# Patient Record
Sex: FEMALE | Race: WHITE | NOT HISPANIC OR LATINO | Employment: FULL TIME | URBAN - METROPOLITAN AREA
[De-identification: names, ages, dates, MRNs, and addresses within clinical notes are randomized per-mention and may not be internally consistent; named-entity substitution may affect disease eponyms.]

---

## 2021-03-23 ENCOUNTER — OFFICE VISIT (OUTPATIENT)
Dept: URGENT CARE | Facility: CLINIC | Age: 21
End: 2021-03-23
Payer: COMMERCIAL

## 2021-03-23 VITALS
HEART RATE: 88 BPM | BODY MASS INDEX: 24.92 KG/M2 | WEIGHT: 140.63 LBS | TEMPERATURE: 97 F | SYSTOLIC BLOOD PRESSURE: 122 MMHG | OXYGEN SATURATION: 97 % | HEIGHT: 63 IN | DIASTOLIC BLOOD PRESSURE: 82 MMHG

## 2021-03-23 DIAGNOSIS — R51.9 ACUTE NONINTRACTABLE HEADACHE, UNSPECIFIED HEADACHE TYPE: ICD-10-CM
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
## 2021-03-23 DIAGNOSIS — M54.12 CERVICAL RADICULOPATHY: ICD-10-CM

## 2021-03-23 PROCEDURE — 99204 OFFICE O/P NEW MOD 45 MIN: CPT | Mod: S$GLB,,, | Performed by: NURSE PRACTITIONER

## 2021-03-23 PROCEDURE — 99204 PR OFFICE/OUTPT VISIT, NEW, LEVL IV, 45-59 MIN: ICD-10-PCS | Mod: S$GLB,,, | Performed by: NURSE PRACTITIONER

## 2021-03-23 RX ORDER — IBUPROFEN 800 MG/1
800 TABLET ORAL EVERY 8 HOURS PRN
Qty: 30 TABLET | Refills: 0 | Status: SHIPPED | OUTPATIENT
Start: 2021-03-23 | End: 2021-04-02

## 2021-03-23 RX ORDER — METHOCARBAMOL 750 MG/1
TABLET, FILM COATED ORAL
Qty: 30 TABLET | Refills: 0 | OUTPATIENT
Start: 2021-03-23 | End: 2022-05-13

## 2021-04-12 ENCOUNTER — OFFICE VISIT (OUTPATIENT)
Dept: OBSTETRICS AND GYNECOLOGY | Facility: CLINIC | Age: 21
End: 2021-04-12
Payer: COMMERCIAL

## 2021-04-12 VITALS
SYSTOLIC BLOOD PRESSURE: 120 MMHG | WEIGHT: 143.75 LBS | BODY MASS INDEX: 25.47 KG/M2 | HEIGHT: 63 IN | DIASTOLIC BLOOD PRESSURE: 70 MMHG

## 2021-04-12 DIAGNOSIS — Z97.5 NEXPLANON IN PLACE: ICD-10-CM

## 2021-04-12 DIAGNOSIS — Z30.46 ENCOUNTER FOR SURVEILLANCE OF IMPLANTABLE SUBDERMAL CONTRACEPTIVE: Primary | ICD-10-CM

## 2021-04-12 PROCEDURE — 99999 PR PBB SHADOW E&M-EST. PATIENT-LVL III: CPT | Mod: PBBFAC,,, | Performed by: SPECIALIST

## 2021-04-12 PROCEDURE — 99203 OFFICE O/P NEW LOW 30 MIN: CPT | Mod: S$GLB,,, | Performed by: SPECIALIST

## 2021-04-12 PROCEDURE — 99999 PR PBB SHADOW E&M-EST. PATIENT-LVL III: ICD-10-PCS | Mod: PBBFAC,,, | Performed by: SPECIALIST

## 2021-04-12 PROCEDURE — 99203 PR OFFICE/OUTPT VISIT, NEW, LEVL III, 30-44 MIN: ICD-10-PCS | Mod: S$GLB,,, | Performed by: SPECIALIST

## 2021-04-12 RX ORDER — ACYCLOVIR 200 MG/1
CAPSULE ORAL
COMMUNITY

## 2021-06-18 ENCOUNTER — TELEPHONE (OUTPATIENT)
Dept: OBSTETRICS AND GYNECOLOGY | Facility: CLINIC | Age: 21
End: 2021-06-18

## 2022-05-13 ENCOUNTER — HOSPITAL ENCOUNTER (EMERGENCY)
Facility: HOSPITAL | Age: 22
Discharge: HOME | End: 2022-05-13
Attending: EMERGENCY MEDICINE
Payer: COMMERCIAL

## 2022-05-13 VITALS
SYSTOLIC BLOOD PRESSURE: 111 MMHG | DIASTOLIC BLOOD PRESSURE: 76 MMHG | HEIGHT: 62 IN | HEART RATE: 79 BPM | RESPIRATION RATE: 18 BRPM | OXYGEN SATURATION: 99 % | TEMPERATURE: 98 F | BODY MASS INDEX: 25.76 KG/M2 | WEIGHT: 140 LBS

## 2022-05-13 DIAGNOSIS — M25.511 ACUTE PAIN OF RIGHT SHOULDER: ICD-10-CM

## 2022-05-13 DIAGNOSIS — S16.1XXA CERVICAL STRAIN, ACUTE, INITIAL ENCOUNTER: ICD-10-CM
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
## 2022-05-13 DIAGNOSIS — R52 PAIN: ICD-10-CM

## 2022-05-13 LAB
B-HCG UR QL: NEGATIVE
CTP QC/QA: YES

## 2022-05-13 PROCEDURE — 25000003 PHARM REV CODE 250: Performed by: NURSE PRACTITIONER

## 2022-05-13 PROCEDURE — 99285 EMERGENCY DEPT VISIT HI MDM: CPT | Mod: 25

## 2022-05-13 PROCEDURE — 81025 URINE PREGNANCY TEST: CPT | Performed by: NURSE PRACTITIONER

## 2022-05-13 RX ORDER — METHOCARBAMOL 500 MG/1
500 TABLET, FILM COATED ORAL
Status: COMPLETED | OUTPATIENT
Start: 2022-05-13 | End: 2022-05-13

## 2022-05-13 RX ORDER — NAPROXEN 250 MG/1
500 TABLET ORAL
Status: COMPLETED | OUTPATIENT
Start: 2022-05-13 | End: 2022-05-13

## 2022-05-13 RX ORDER — METHOCARBAMOL 500 MG/1
500 TABLET, FILM COATED ORAL 3 TIMES DAILY
Qty: 30 TABLET | Refills: 0 | Status: SHIPPED | OUTPATIENT
Start: 2022-05-13 | End: 2022-05-18

## 2022-05-13 RX ORDER — DICLOFENAC SODIUM 50 MG/1
50 TABLET, DELAYED RELEASE ORAL 3 TIMES DAILY PRN
Qty: 20 TABLET | Refills: 2 | Status: SHIPPED | OUTPATIENT
Start: 2022-05-13

## 2022-05-13 RX ADMIN — NAPROXEN 500 MG: 250 TABLET ORAL at 04:05

## 2022-05-13 RX ADMIN — METHOCARBAMOL TABLETS 500 MG: 500 TABLET, COATED ORAL at 04:05

## 2022-05-13 NOTE — Clinical Note
"Sherry Combsa" Peter was seen and treated in our emergency department on 5/13/2022.  She may return to work on 05/17/2022.       If you have any questions or concerns, please don't hesitate to call.      Mindy Guzman NP"
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
## 2022-11-17 ENCOUNTER — OFFICE VISIT (OUTPATIENT)
Dept: URGENT CARE | Facility: CLINIC | Age: 22
End: 2022-11-17
Payer: COMMERCIAL

## 2022-11-17 VITALS
TEMPERATURE: 98 F | DIASTOLIC BLOOD PRESSURE: 75 MMHG | WEIGHT: 140 LBS | BODY MASS INDEX: 25.76 KG/M2 | HEIGHT: 62 IN | OXYGEN SATURATION: 99 % | RESPIRATION RATE: 16 BRPM | HEART RATE: 77 BPM | SYSTOLIC BLOOD PRESSURE: 105 MMHG

## 2022-11-17 DIAGNOSIS — M79.675 PAIN AND SWELLING OF TOE OF LEFT FOOT: Primary | ICD-10-CM

## 2022-11-17 DIAGNOSIS — M79.89 PAIN AND SWELLING OF TOE OF LEFT FOOT: Primary | ICD-10-CM

## 2022-11-17 PROCEDURE — 3078F DIAST BP <80 MM HG: CPT | Mod: CPTII,S$GLB,, | Performed by: FAMILY MEDICINE

## 2022-11-17 PROCEDURE — 3074F PR MOST RECENT SYSTOLIC BLOOD PRESSURE < 130 MM HG: ICD-10-PCS | Mod: CPTII,S$GLB,, | Performed by: FAMILY MEDICINE

## 2022-11-17 PROCEDURE — 3074F SYST BP LT 130 MM HG: CPT | Mod: CPTII,S$GLB,, | Performed by: FAMILY MEDICINE

## 2022-11-17 PROCEDURE — 1160F PR REVIEW ALL MEDS BY PRESCRIBER/CLIN PHARMACIST DOCUMENTED: ICD-10-PCS | Mod: CPTII,S$GLB,, | Performed by: FAMILY MEDICINE

## 2022-11-17 PROCEDURE — 1159F MED LIST DOCD IN RCRD: CPT | Mod: CPTII,S$GLB,, | Performed by: FAMILY MEDICINE

## 2022-11-17 PROCEDURE — 99213 PR OFFICE/OUTPT VISIT, EST, LEVL III, 20-29 MIN: ICD-10-PCS | Mod: S$GLB,,, | Performed by: FAMILY MEDICINE

## 2022-11-17 PROCEDURE — 3078F PR MOST RECENT DIASTOLIC BLOOD PRESSURE < 80 MM HG: ICD-10-PCS | Mod: CPTII,S$GLB,, | Performed by: FAMILY MEDICINE

## 2022-11-17 PROCEDURE — 3008F BODY MASS INDEX DOCD: CPT | Mod: CPTII,S$GLB,, | Performed by: FAMILY MEDICINE

## 2022-11-17 PROCEDURE — 3008F PR BODY MASS INDEX (BMI) DOCUMENTED: ICD-10-PCS | Mod: CPTII,S$GLB,, | Performed by: FAMILY MEDICINE

## 2022-11-17 PROCEDURE — 1160F RVW MEDS BY RX/DR IN RCRD: CPT | Mod: CPTII,S$GLB,, | Performed by: FAMILY MEDICINE

## 2022-11-17 PROCEDURE — 99213 OFFICE O/P EST LOW 20 MIN: CPT | Mod: S$GLB,,, | Performed by: FAMILY MEDICINE

## 2022-11-17 PROCEDURE — 1159F PR MEDICATION LIST DOCUMENTED IN MEDICAL RECORD: ICD-10-PCS | Mod: CPTII,S$GLB,, | Performed by: FAMILY MEDICINE

## 2022-11-17 RX ORDER — SULFAMETHOXAZOLE AND TRIMETHOPRIM 800; 160 MG/1; MG/1
1 TABLET ORAL 2 TIMES DAILY
Qty: 20 TABLET | Refills: 0 | Status: SHIPPED | OUTPATIENT
Start: 2022-11-17

## 2022-11-17 RX ORDER — MUPIROCIN 20 MG/G
OINTMENT TOPICAL 3 TIMES DAILY
Qty: 22 G | Refills: 2 | Status: SHIPPED | OUTPATIENT
Start: 2022-11-17

## 2022-11-17 NOTE — PROGRESS NOTES
"Subjective:       Patient ID: Sherry Green is a 22 y.o. female.    Vitals:  height is 5' 2" (1.575 m) and weight is 63.5 kg (140 lb). Her oral temperature is 98.1 °F (36.7 °C). Her blood pressure is 105/75 and her pulse is 77. Her respiration is 16 and oxygen saturation is 99%.     Chief Complaint: Pain    Red painful spot on bottom of left great toe. Pain 5/10    Pain  This is a new problem. The current episode started more than 1 month ago. The problem occurs constantly. Nothing aggravates the symptoms. She has tried nothing for the symptoms. The treatment provided no relief.   ROS    Objective:      Physical Exam    Left great toe. Swelling and erythema compared to the right great toe. Normal ROM but limited by swelling  Plantar surface apparent corn. Area of desquamation on dorsum of toe  Assessment:       1. Pain and swelling of toe of left foot          Plan:     Advised of splints could try at home with gauze.     Pain and swelling of toe of left foot  -     Ambulatory referral/consult to Podiatry    Other orders  -     sulfamethoxazole-trimethoprim 800-160mg (BACTRIM DS) 800-160 mg Tab; Take 1 tablet by mouth 2 (two) times daily.  Dispense: 20 tablet; Refill: 0  -     mupirocin (BACTROBAN) 2 % ointment; Apply topically 3 (three) times daily.  Dispense: 22 g; Refill: 2                   "

## 2022-11-17 NOTE — LETTER
November 17, 2022      [facility]  [address], [address]  [place] [address]  Phone: [phone]  Fax: [phone]       Patient: Sherry Green   YOB: 2000  Date of Visit: 11/17/2022    To Whom It May Concern:    Jc Green  was at [facility] on 11/17/2022. Please excuse for days missed. If you have any questions or concerns, or if I can be of further assistance, please do not hesitate to contact me.    Sincerely,    Lauren Palomino, RT

## 2023-01-05 ENCOUNTER — OCCUPATIONAL HEALTH (OUTPATIENT)
Dept: URGENT CARE | Facility: CLINIC | Age: 23
End: 2023-01-05

## 2023-01-05 DIAGNOSIS — Z23 IMMUNIZATION DUE: Primary | ICD-10-CM

## 2023-01-05 PROCEDURE — 86706 HEPATITIS B SURFACE ANTIGEN: ICD-10-PCS | Mod: S$GLB,,, | Performed by: FAMILY MEDICINE

## 2023-01-05 PROCEDURE — 86706 HEP B SURFACE ANTIBODY: CPT | Mod: S$GLB,,, | Performed by: FAMILY MEDICINE

## 2024-10-07 ENCOUNTER — PATIENT MESSAGE (OUTPATIENT)
Dept: ADMINISTRATIVE | Facility: OTHER | Age: 24
End: 2024-10-07

## 2024-10-24 ENCOUNTER — OFFICE VISIT (OUTPATIENT)
Dept: URGENT CARE | Facility: CLINIC | Age: 24
End: 2024-10-24
Payer: COMMERCIAL

## 2024-10-24 VITALS
BODY MASS INDEX: 23 KG/M2 | HEART RATE: 105 BPM | DIASTOLIC BLOOD PRESSURE: 84 MMHG | HEIGHT: 62 IN | RESPIRATION RATE: 16 BRPM | SYSTOLIC BLOOD PRESSURE: 115 MMHG | OXYGEN SATURATION: 99 % | TEMPERATURE: 99 F | WEIGHT: 125 LBS

## 2024-10-24 DIAGNOSIS — J01.90 ACUTE BACTERIAL SINUSITIS: Primary | ICD-10-CM

## 2024-10-24 DIAGNOSIS — R05.1 ACUTE COUGH: ICD-10-CM

## 2024-10-24 DIAGNOSIS — M54.2 NECK PAIN: ICD-10-CM

## 2024-10-24 DIAGNOSIS — B96.89 ACUTE BACTERIAL SINUSITIS: Primary | ICD-10-CM

## 2024-10-24 PROCEDURE — 99213 OFFICE O/P EST LOW 20 MIN: CPT | Mod: S$GLB,,, | Performed by: NURSE PRACTITIONER

## 2024-10-24 RX ORDER — BENZONATATE 100 MG/1
100 CAPSULE ORAL 3 TIMES DAILY PRN
Qty: 30 CAPSULE | Refills: 0 | Status: SHIPPED | OUTPATIENT
Start: 2024-10-24 | End: 2024-11-03

## 2024-10-24 RX ORDER — AMOXICILLIN AND CLAVULANATE POTASSIUM 875; 125 MG/1; MG/1
1 TABLET, FILM COATED ORAL 2 TIMES DAILY
Qty: 20 TABLET | Refills: 0 | Status: SHIPPED | OUTPATIENT
Start: 2024-10-24

## 2024-10-24 RX ORDER — DEXTROAMPHETAMINE 9 MG/1
1 PATCH, EXTENDED RELEASE TRANSDERMAL
COMMUNITY
Start: 2024-10-01

## 2024-10-24 RX ORDER — ERGOCALCIFEROL 1.25 MG/1
50000 CAPSULE ORAL
COMMUNITY

## 2024-10-24 NOTE — PATIENT INSTRUCTIONS
Sinusitis (Antibiotic Treatment)    The sinuses are air-filled spaces within the bones of the face. They connect to the inside of the nose. Sinusitis is an inflammation of the tissue lining the sinus cavity. Sinus inflammation can occur during a cold. It can also be due to allergies to pollens and other particles in the air. Sinusitis can cause symptoms of sinus congestion and fullness. A sinus infection causes fever, headache and facial pain. There is often green or yellow drainage from the nose or into the back of the throat (post-nasal drip). You have been given antibiotics to treat this condition.  Home care:  Take the full course of antibiotics as instructed. Do not stop taking them, even if you feel better.  Drink plenty of water, hot tea, and other liquids. This may help thin mucus. It also may promote sinus drainage.  Heat may help soothe painful areas of the face. Use a towel soaked in hot water. Or,  the shower and direct the hot spray onto your face. Using a vaporizer along with a menthol rub at night may also help.   An expectorant containing guaifenesin may help thin the mucus and promote drainage from the sinuses.  Over-the-counter decongestants may be used unless a similar medicine was prescribed. Nasal sprays work the fastest. Use one that contains phenylephrine or oxymetazoline. First blow the nose gently. Then use the spray. Do not use these medicines more often than directed on the label or symptoms may get worse. You may also use tablets containing pseudoephedrine. Avoid products that combine ingredients, because side effects may be increased. Read labels. You can also ask the pharmacist for help. (NOTE: Persons with high blood pressure should not use decongestants. They can raise blood pressure.)  Over-the-counter antihistamines may help if allergies contributed to your sinusitis.    Do not use nasal rinses or irrigation during an acute sinus infection, unless told to by your health care  provider. Rinsing may spread the infection to other sinuses.  Use acetaminophen or ibuprofen to control pain, unless another pain medicine was prescribed. (If you have chronic liver or kidney disease or ever had a stomach ulcer, talk with your doctor before using these medicines. Aspirin should never be used in anyone under 18 years of age who is ill with a fever. It may cause severe liver damage.)  Don't smoke. This can worsen symptoms.  Follow-up care  Follow up with your healthcare provider or our staff if you are not improving within the next week.  When to seek medical advice  Call your healthcare provider if any of these occur:  Facial pain or headache becoming more severe  Stiff neck  Unusual drowsiness or confusion  Swelling of the forehead or eyelids  Vision problems, including blurred or double vision  Fever of 100.4ºF (38ºC) or higher, or as directed by your healthcare provider  Seizure  Breathing problems  Symptoms not resolving within 10 days  Date Last Reviewed: 4/13/2015 © 2000-2016 [employer]. [address] [place] [address]. All rights reserved. This information is not intended as a substitute for professional medical care. Always follow your healthcare professional's instructions.                                                                    Sinusitis   If your condition worsens or fails to improve we recommend that you receive another evaluation at the ER immediately or contact your PCP to discuss your concerns or return here. You must understand that you've received an urgent care treatment only and that you may be released before all your medical problems are known or treated. You the patient will arrange for followup care as instructed.   If we discussed that I think your illness is viral it will not respond to antibiotics and it will last 10-14 days. However, if over the next few days the symptoms worsen start the antibiotics I have given you.   If we discussed  "that you require antibiotics start them now and take them to completion.   If you are female and on BCP and do take the antibiotics, use additional methods to prevent pregnancy while on the antibiotics and for one cycle after.   Flonase (fluticasone) is a nasal spray which is available over the counter and may help with your symptoms   Zyrtec D, Claritin D or allegra D can also help with symptoms of congestion and drainage.   If you have hypertension avoid using the "D" which is the decongestant   If you just have drainage you can take plain zyrtec, claritin or allegra   If you just have a congested feeling you can take pseudoephedrine (unless you have high blood pressure) which you have to sign for behind the counter. Do not buy the phenylephrine which is on the shelf as it is not effective   Rest and fluids are also important.   Tylenol or ibuprofen can also be used as directed for pain unless you have an allergy to them or medical condition such as stomach ulcers, kidney or liver disease or blood thinners etc for which you should not be taking these type of medications.   If you are flying in the next few days Afrin nose drops for the airplane flight upon take off and landing may help. Other than at those times refrain from using afrin.   If you were prescribed a narcotic do not drive or operate heavy machinery while taking these medications.     -Please take antibiotic to completion.  -Below are suggestions for symptomatic relief:              -Tylenol every 4 hours OR ibuprofen every 6 hours as needed for pain/fever.              -Salt water gargles to soothe throat pain.              -Chloroseptic spray also helps to numb throat pain.              -Nasal saline spray reduces inflammation and dryness.              -Warm face compresses to help with facial sinus pain/pressure.              -Vicks vapor rub at night.              -Flonase OTC or Nasacort OTC for nasal congestion.              -Simple foods like " chicken noodle soup.              -Delsym helps with coughing at night              -Zyrtec/Claritin during the day & Benadryl at night may help with allergies.
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
## 2024-11-06 ENCOUNTER — OFFICE VISIT (OUTPATIENT)
Dept: OBSTETRICS AND GYNECOLOGY | Facility: CLINIC | Age: 24
End: 2024-11-06
Payer: COMMERCIAL

## 2024-11-06 VITALS
BODY MASS INDEX: 22.8 KG/M2 | SYSTOLIC BLOOD PRESSURE: 104 MMHG | HEIGHT: 62 IN | DIASTOLIC BLOOD PRESSURE: 62 MMHG | WEIGHT: 123.88 LBS

## 2024-11-06 DIAGNOSIS — Z30.011 ENCOUNTER FOR INITIAL PRESCRIPTION OF CONTRACEPTIVE PILLS: Primary | ICD-10-CM

## 2024-11-06 PROCEDURE — 1159F MED LIST DOCD IN RCRD: CPT | Mod: CPTII,S$GLB,,

## 2024-11-06 PROCEDURE — 99999 PR PBB SHADOW E&M-EST. PATIENT-LVL III: CPT | Mod: PBBFAC,,,

## 2024-11-06 PROCEDURE — 3074F SYST BP LT 130 MM HG: CPT | Mod: CPTII,S$GLB,,

## 2024-11-06 PROCEDURE — 99203 OFFICE O/P NEW LOW 30 MIN: CPT | Mod: S$GLB,,,

## 2024-11-06 PROCEDURE — 1160F RVW MEDS BY RX/DR IN RCRD: CPT | Mod: CPTII,S$GLB,,

## 2024-11-06 PROCEDURE — 3008F BODY MASS INDEX DOCD: CPT | Mod: CPTII,S$GLB,,

## 2024-11-06 PROCEDURE — 3078F DIAST BP <80 MM HG: CPT | Mod: CPTII,S$GLB,,

## 2024-11-06 RX ORDER — DICYCLOMINE HYDROCHLORIDE 10 MG/1
CAPSULE ORAL
COMMUNITY
Start: 2024-09-18

## 2024-11-06 RX ORDER — VALACYCLOVIR HYDROCHLORIDE 1 G/1
1000 TABLET, FILM COATED ORAL 2 TIMES DAILY
COMMUNITY
Start: 2024-09-23

## 2024-11-06 RX ORDER — NORETHINDRONE ACETATE AND ETHINYL ESTRADIOL, ETHINYL ESTRADIOL AND FERROUS FUMARATE 1MG-10(24)
1 KIT ORAL DAILY
Qty: 90 TABLET | Refills: 0 | Status: SHIPPED | OUTPATIENT
Start: 2024-11-06 | End: 2025-02-04

## 2024-11-06 NOTE — PROGRESS NOTES
"Chief Complaint   Patient presents with    [facility]    Contraception    Well Woman       History of Present Illness   24 y.o. F patient presents today for contraception. She has previously been on Nexplanon. Removed by provider in [place] July 2023. Not currently sexually active. Reports normal pap smear with GYN in [place] last. Currently undergoing work up for possible autoimmune issue. Experiencing joint pain and bruises easily.      Patient's last menstrual period was 10/31/2024 (exact date).     Past medical and surgical history reviewed.   I have reviewed the patient's medical history in detail and updated the computerized patient record.  Review of patient's allergies indicates:  No Known Allergies  Review of Systems  Constitutional: negative for chills and fatigue  Gastrointestinal: negative for abdominal pain, constipation, diarrhea, nausea and vomiting  Genitourinary:negative for abnormal menstrual periods, genital lesions and vaginal discharge, dysuria, frequency and hematuria    Physical Examination:  /62 (Patient Position: Sitting)   Ht 5' 2" (1.575 m)   Wt 56.2 kg (123 lb 14.4 oz)   LMP 10/31/2024 (Exact Date)   BMI 22.66 kg/m²    Physical Exam  Deferred    Assessment/Plan:  Encounter for initial prescription of contraceptive pills  -     norethindrone-e.estradioL-iron (LO LOESTRIN FE) 1 mg-10 mcg (24)/10 mcg (2) Tab; Take 1 tablet by mouth once daily.  Dispense: 90 tablet; Refill: 0      Long discussion regarding options. Do not recommend Nexplanon or DMPA 2/2 hx of Vit D insufficiency    The use of hormonal contraception has been fully discussed with the patient. We discussed all options including OCPs, transdermal patches, vaginal ring, Depo Provera injections, Implanon, and IUD. Warnings about anticipated minor side effects such as breakthrough spotting, nausea, breast tenderness, weight changes, acne, headaches, etc were given.  She has been told of the more serious potential side " effects such as MI, stroke, and deep vein thrombosis, all of which are very unlikely.  She has been asked to report any signs of such serious problems immediately. The need for additional protection, such as a condom, to prevent exposure to sexually transmitted diseases has also been discussed- the patient has been clearly reminded that no hormonal contraceptive method can protect her against diseases such as HIV and others. She understands and wishes to take the medication as prescribed. Patient was given the opportunity to ask questions. She wishes to begin oral contraceptives (estrogen/progesterone)    Follow up in 3 months to discuss new medication  Asked to send pap smear results via portal message

## 2024-12-10 ENCOUNTER — OFFICE VISIT (OUTPATIENT)
Dept: URGENT CARE | Facility: CLINIC | Age: 24
End: 2024-12-10
Payer: COMMERCIAL

## 2024-12-10 VITALS
BODY MASS INDEX: 22.63 KG/M2 | RESPIRATION RATE: 18 BRPM | HEIGHT: 62 IN | TEMPERATURE: 98 F | OXYGEN SATURATION: 98 % | DIASTOLIC BLOOD PRESSURE: 63 MMHG | WEIGHT: 123 LBS | SYSTOLIC BLOOD PRESSURE: 118 MMHG | HEART RATE: 87 BPM

## 2024-12-10 DIAGNOSIS — J06.9 UPPER RESPIRATORY TRACT INFECTION, UNSPECIFIED TYPE: Primary | ICD-10-CM

## 2024-12-10 DIAGNOSIS — J11.1 FLU: ICD-10-CM

## 2024-12-10 LAB
CTP QC/QA: YES
POC MOLECULAR INFLUENZA A AGN: POSITIVE
POC MOLECULAR INFLUENZA B AGN: NEGATIVE

## 2024-12-10 PROCEDURE — 87502 INFLUENZA DNA AMP PROBE: CPT | Mod: QW,S$GLB,, | Performed by: NURSE PRACTITIONER

## 2024-12-10 PROCEDURE — 99213 OFFICE O/P EST LOW 20 MIN: CPT | Mod: S$GLB,,, | Performed by: NURSE PRACTITIONER

## 2024-12-10 RX ORDER — OSELTAMIVIR PHOSPHATE 75 MG/1
75 CAPSULE ORAL 2 TIMES DAILY
Qty: 10 CAPSULE | Refills: 0 | Status: SHIPPED | OUTPATIENT
Start: 2024-12-10 | End: 2024-12-15

## 2024-12-11 NOTE — PROGRESS NOTES
"Subjective:      Patient ID: Sherry Green is a 24 y.o. female.    Vitals:  height is 5' 2" (1.575 m) and weight is 55.8 kg (123 lb). Her oral temperature is 97.9 °F (36.6 °C). Her blood pressure is 118/63 and her pulse is 87. Her respiration is 18 and oxygen saturation is 98%.     Chief Complaint: Influenza    SUBJECTIVE:   Sherry Green is a 24 y.o. female who present complaining of flu-like symptoms: fevers, chills, myalgias, congestion, sore throat and cough for 2 days. Denies dyspnea or wheezing.        Influenza  This is a new problem. The current episode started yesterday. Associated symptoms include chills, congestion, a fever and a sore throat. Nothing aggravates the symptoms. She has tried acetaminophen for the symptoms. The treatment provided mild relief.       Constitution: Positive for chills and fever.   HENT:  Positive for congestion and sore throat.       Objective:     Physical Exam   Constitutional: She is oriented to person, place, and time. She appears well-developed. She is cooperative.  Non-toxic appearance. She does not appear ill. No distress.   HENT:   Head: Normocephalic and atraumatic.   Ears:   Right Ear: Hearing, tympanic membrane, external ear and ear canal normal.   Left Ear: Hearing, tympanic membrane, external ear and ear canal normal.   Nose: Rhinorrhea present. No mucosal edema or nasal deformity. No epistaxis. Right sinus exhibits no maxillary sinus tenderness and no frontal sinus tenderness. Left sinus exhibits no maxillary sinus tenderness and no frontal sinus tenderness.   Mouth/Throat: Uvula is midline, oropharynx is clear and moist and mucous membranes are normal. No trismus in the jaw. Normal dentition. No uvula swelling. Cobblestoning present. No oropharyngeal exudate, posterior oropharyngeal edema or posterior oropharyngeal erythema.   Eyes: Conjunctivae and lids are normal. No scleral icterus.   Neck: Trachea normal and phonation normal. Neck supple. No edema present. " No erythema present. No neck rigidity present.   Cardiovascular: Normal rate, regular rhythm, normal heart sounds and normal pulses.   Pulmonary/Chest: Effort normal and breath sounds normal. No respiratory distress. She has no decreased breath sounds. She has no rhonchi.   Abdominal: Normal appearance.   Musculoskeletal: Normal range of motion.         General: No deformity. Normal range of motion.   Neurological: She is alert and oriented to person, place, and time. She exhibits normal muscle tone. Coordination normal.   Skin: Skin is warm, dry, intact, not diaphoretic and not pale.   Psychiatric: Her speech is normal and behavior is normal. Judgment and thought content normal.   Nursing note and vitals reviewed.      Assessment:     1. Upper respiratory tract infection, unspecified type    2. Flu      Results for orders placed or performed in visit on 12/10/24   POCT Influenza A/B MOLECULAR    Collection Time: 12/10/24  7:35 PM   Result Value Ref Range    POC Molecular Influenza A Ag Positive (A) Negative    POC Molecular Influenza B Ag Negative Negative     Acceptable Yes        Plan:       Upper respiratory tract infection, unspecified type  -     POCT Influenza A/B MOLECULAR    Flu  -     oseltamivir (TAMIFLU) 75 MG capsule; Take 1 capsule (75 mg total) by mouth 2 (two) times daily. for 5 days  Dispense: 10 capsule; Refill: 0      INSTRUCTIONS:  - Rest.  - Drink plenty of fluids.  - Take Tylenol and/or Ibuprofen as directed as needed for fever/pain.  Do not take more than the recommended dose.  - follow up with your PCP within the next 1-2 weeks as needed.  - You must understand that you have received an Urgent Care treatment only and that you may be released before all of your medical problems are known or treated.   - You, the patient, will arrange for follow up care as instructed.   - If your condition worsens or fails to improve we recommend that you receive another evaluation at the ER  immediately or contact your PCP to discuss your concerns.   - You can call [phone] or [phone] to help schedule an appointment with the appropriate provider.     -If you smoke cigarettes, it would be beneficial for you to stop.    Monitor for new or worsening symptoms    OTC for symptom control    Recommend follow up with PCP/Pediatrician if symptoms are worsening

## 2025-01-21 ENCOUNTER — TELEPHONE (OUTPATIENT)
Dept: GASTROENTEROLOGY | Facility: CLINIC | Age: 25
End: 2025-01-21
Payer: COMMERCIAL

## 2025-01-28 ENCOUNTER — PATIENT MESSAGE (OUTPATIENT)
Dept: GASTROENTEROLOGY | Facility: CLINIC | Age: 25
End: 2025-01-28

## 2025-01-28 ENCOUNTER — OFFICE VISIT (OUTPATIENT)
Dept: GASTROENTEROLOGY | Facility: CLINIC | Age: 25
End: 2025-01-28
Payer: COMMERCIAL

## 2025-01-28 ENCOUNTER — TELEPHONE (OUTPATIENT)
Dept: GASTROENTEROLOGY | Facility: CLINIC | Age: 25
End: 2025-01-28
Payer: COMMERCIAL

## 2025-01-28 VITALS — BODY MASS INDEX: 22.44 KG/M2 | WEIGHT: 121.94 LBS | HEIGHT: 62 IN

## 2025-01-28 DIAGNOSIS — R19.8 IRREGULAR BOWEL HABITS: ICD-10-CM

## 2025-01-28 DIAGNOSIS — R19.7 INTERMITTENT DIARRHEA: ICD-10-CM

## 2025-01-28 DIAGNOSIS — R11.0 NAUSEA: ICD-10-CM

## 2025-01-28 DIAGNOSIS — K21.9 GASTROESOPHAGEAL REFLUX DISEASE, UNSPECIFIED WHETHER ESOPHAGITIS PRESENT: ICD-10-CM

## 2025-01-28 DIAGNOSIS — R10.12 LUQ PAIN: Primary | ICD-10-CM

## 2025-01-28 DIAGNOSIS — R10.9 ABDOMINAL CRAMPING: ICD-10-CM

## 2025-01-28 DIAGNOSIS — R63.4 WEIGHT LOSS: ICD-10-CM

## 2025-01-28 DIAGNOSIS — R10.84 GENERALIZED ABDOMINAL PAIN: ICD-10-CM

## 2025-01-28 PROCEDURE — 1159F MED LIST DOCD IN RCRD: CPT | Mod: CPTII,S$GLB,,

## 2025-01-28 PROCEDURE — 99999 PR PBB SHADOW E&M-EST. PATIENT-LVL IV: CPT | Mod: PBBFAC,,,

## 2025-01-28 PROCEDURE — 3008F BODY MASS INDEX DOCD: CPT | Mod: CPTII,S$GLB,,

## 2025-01-28 PROCEDURE — 1160F RVW MEDS BY RX/DR IN RCRD: CPT | Mod: CPTII,S$GLB,,

## 2025-01-28 PROCEDURE — 99204 OFFICE O/P NEW MOD 45 MIN: CPT | Mod: S$GLB,,,

## 2025-01-28 RX ORDER — CETIRIZINE HYDROCHLORIDE 10 MG/1
10 TABLET ORAL
COMMUNITY
Start: 2024-12-24

## 2025-01-28 RX ORDER — PANTOPRAZOLE SODIUM 40 MG/1
40 TABLET, DELAYED RELEASE ORAL DAILY
Qty: 90 TABLET | Refills: 0 | Status: SHIPPED | OUTPATIENT
Start: 2025-01-28 | End: 2025-04-28

## 2025-01-28 RX ORDER — DICLOFENAC SODIUM 10 MG/G
GEL TOPICAL
COMMUNITY

## 2025-01-28 RX ORDER — DICYCLOMINE HYDROCHLORIDE 10 MG/1
10 CAPSULE ORAL 4 TIMES DAILY PRN
Qty: 360 CAPSULE | Refills: 0 | Status: SHIPPED | OUTPATIENT
Start: 2025-01-28 | End: 2025-04-28

## 2025-01-28 NOTE — PROGRESS NOTES
Subjective:       Patient ID: Sherry Green is a 25 y.o. female Body mass index is 22.3 kg/m².    Chief Complaint: GI Problem    This patient is new to me.  Referred from Dr. Melo for vitamin D deficiency.     GI Problem  The primary symptoms include weight loss (Initially intentional eating smaller portions and healthier options, but weight loss continued without trying; decreased appetite (eats 1 large meal & snacks daily); ADHD meds have also changed appetite (started medication 08/2024)), abdominal pain, nausea (Constant but mild and worsens in certain positions such as laying) and diarrhea. Primary symptoms do not include fever, fatigue, vomiting, melena, hematemesis, jaundice, hematochezia or dysuria.   The abdominal pain began more than 2 days ago (chronic). The abdominal pain has been unchanged since its onset. The abdominal pain is located in the LUQ and epigastric region. The severity of the abdominal pain is 3/10 (Constant cramp that worsens with palpation or after eating certain foods (pizza, too much bread, too much dairy, or certain eggs)). Relieved by: Tries to avoid known triggers, but sometimes still experiencing pain; takes Bentyl 10 mg p.r.n. for severe abdominal pain with improvement.   The diarrhea began more than 1 week ago (Fluctuations in stool texture for years). The diarrhea is watery and semi-solid (Varies between 4 and 7 on Filion scale; rated 4 this morning). The diarrhea occurs once per day. Risk factors for illness producing diarrhea include recent antibiotic use (URI 12/24 treated with antibiotics; denies suspect food, recent foreign travel).   The illness does not include chills, dysphagia, odynophagia or constipation. Associated symptoms comments: Patient reports history of vitamin-D deficiency.  Being  followed by Rheumatology to investigate possible autoimmune disorder. Significant associated medical issues include GERD (Weight loss improved reflux; takes Pepto-Bismol p.r.n.  with improvement; once monthly wakes due to choking on stomach acid/refluxing). Associated medical issues do not include inflammatory bowel disease, gallstones, liver disease, alcohol abuse, PUD, gastric bypass, bowel resection, hemorrhoids or diverticulitis.     Review of Systems   Constitutional:  Positive for appetite change, unexpected weight change and weight loss (Initially intentional eating smaller portions and healthier options, but weight loss continued without trying; decreased appetite (eats 1 large meal & snacks daily); ADHD meds have also changed appetite (started medication 08/2024)). Negative for activity change, chills, diaphoresis, fatigue and fever.   HENT:  Negative for sore throat and trouble swallowing.    Respiratory:  Positive for choking (during episodes of reflux). Negative for cough and shortness of breath.    Cardiovascular:  Negative for chest pain.   Gastrointestinal:  Positive for abdominal pain, diarrhea and nausea (Constant but mild and worsens in certain positions such as laying). Negative for abdominal distention, anal bleeding, blood in stool, constipation, dysphagia, hematemesis, hematochezia, jaundice, melena, rectal pain and vomiting.   Genitourinary:  Negative for dysuria.       Patient's last menstrual period was 01/25/2025 (exact date).  Past Medical History:   Diagnosis Date    ADHD     H/O cold sores     Vitamin D insufficiency      Past Surgical History:   Procedure Laterality Date    shoulder surgery Right      Family History   Problem Relation Name Age of Onset    Other (connective tissue disorder) Mother      Lupus Father      Other (pulmonary htn) Maternal Grandmother          passed at age 27    Ulcerative colitis Paternal Grandmother      Breast cancer Neg Hx      Ovarian cancer Neg Hx      Colon cancer Neg Hx      Esophageal cancer Neg Hx      Stomach cancer Neg Hx      Crohn's disease Neg Hx      Colon polyps Neg Hx       Social History     Tobacco Use    Smoking  status: Never     Passive exposure: Never    Smokeless tobacco: Never   Substance Use Topics    Alcohol use: Never    Drug use: Never     Wt Readings from Last 10 Encounters:   01/28/25 55.3 kg (121 lb 14.6 oz)   12/10/24 55.8 kg (123 lb)   11/06/24 56.2 kg (123 lb 14.4 oz)   10/24/24 56.7 kg (125 lb)   03/20/24 72.6 kg (160 lb)   11/17/22 63.5 kg (140 lb)   05/13/22 63.5 kg (140 lb)   04/12/21 65.2 kg (143 lb 11.8 oz)   03/23/21 63.8 kg (140 lb 9.6 oz)     Lab Results   Component Value Date    WBC 9.83 03/20/2024    HGB 14.3 03/20/2024    HCT 40.4 03/20/2024    MCV 86 03/20/2024     03/20/2024     CMP  Sodium   Date Value Ref Range Status   03/20/2024 138 136 - 145 mmol/L Final     Potassium   Date Value Ref Range Status   03/20/2024 3.7 3.5 - 5.1 mmol/L Final     Comment:     Anion Gap reference range revised on 4/28/2023     Chloride   Date Value Ref Range Status   03/20/2024 105 95 - 110 mmol/L Final     CO2   Date Value Ref Range Status   03/20/2024 25 22 - 31 mmol/L Final     Glucose   Date Value Ref Range Status   03/20/2024 115 (H) 70 - 110 mg/dL Final     Comment:     The ADA recommends the following guidelines for fasting glucose:    Normal:       less than 100 mg/dL    Prediabetes:  100 mg/dL to 125 mg/dL    Diabetes:     126 mg/dL or higher       BUN   Date Value Ref Range Status   03/20/2024 10 7 - 18 mg/dL Final     Creatinine   Date Value Ref Range Status   03/20/2024 0.65 0.50 - 1.40 mg/dL Final     Calcium   Date Value Ref Range Status   03/20/2024 9.7 8.4 - 10.2 mg/dL Final     Total Protein   Date Value Ref Range Status   03/20/2024 7.9 6.0 - 8.4 g/dL Final     Albumin   Date Value Ref Range Status   03/20/2024 4.8 3.5 - 5.2 g/dL Final     Total Bilirubin   Date Value Ref Range Status   03/20/2024 1.0 0.2 - 1.3 mg/dL Final     Alkaline Phosphatase   Date Value Ref Range Status   03/20/2024 63 38 - 145 U/L Final     AST   Date Value Ref Range Status   03/20/2024 31 14 - 36 U/L Final     ALT    Date Value Ref Range Status   03/20/2024 18 0 - 35 U/L Final     Anion Gap   Date Value Ref Range Status   03/20/2024 8 5 - 12 mmol/L Final     Comment:     Anion Gap reference range revised on 4/28/2023     Reviewed prior medical records including radiology report of chest x-ray 06/14/2023 & endoscopy history (see surgical history).    Objective:      Physical Exam  Vitals and nursing note reviewed.   Constitutional:       General: She is not in acute distress.     Appearance: Normal appearance. She is normal weight. She is not ill-appearing.   HENT:      Mouth/Throat:      Lips: Pink. No lesions.   Cardiovascular:      Pulses: Normal pulses.      Heart sounds: Normal heart sounds.   Pulmonary:      Effort: Pulmonary effort is normal. No respiratory distress.      Breath sounds: Normal breath sounds.   Abdominal:      General: Abdomen is flat. Bowel sounds are normal. There is no distension or abdominal bruit. There are no signs of injury.      Palpations: Abdomen is soft. There is no shifting dullness, fluid wave, hepatomegaly, splenomegaly or mass.      Tenderness: There is generalized abdominal tenderness and tenderness in the epigastric area and suprapubic area. There is no guarding or rebound. Negative signs include Kaye's sign, Rovsing's sign and McBurney's sign.   Skin:     General: Skin is warm and dry.      Coloration: Skin is not jaundiced or pale.   Neurological:      Mental Status: She is alert and oriented to person, place, and time.   Psychiatric:         Attention and Perception: Attention normal.         Mood and Affect: Mood normal.         Speech: Speech normal.         Behavior: Behavior normal.         Assessment:       1. LUQ pain    2. Generalized abdominal pain    3. Abdominal cramping    4. Intermittent diarrhea    5. Irregular bowel habits    6. Gastroesophageal reflux disease, unspecified whether esophagitis present    7. Nausea    8. Weight loss        Plan:       Patient reports she  will send me recent blood work via [employer] for review.    LUQ pain  - schedule EGD, discussed procedure with patient, including risks and benefits, patient verbalized understanding  -Avoid large meals, avoid eating within 2-3 hours of bedtime (avoid late night eating & lying down soon after eating), elevate head of bed if nocturnal symptoms are present, smoking cessation (if current smoker), & weight loss (if overweight).   -Avoid known foods which trigger symptoms & to minimize/avoid high-fat foods, chocolate, caffeine, citrus, alcohol, & tomato products.  -Avoid/limit use of NSAID's, since they can cause GI upset, bleeding, and/or ulcers. If needed, take with food.  -START:  pantoprazole (PROTONIX) 40 MG tablet; Take 1 tablet (40 mg total) by mouth once daily.  Dispense: 90 tablet; Refill: 0  -     H. pylori antigen, stool; Future; Expected date: 01/28/2025  -     CT Abdomen Pelvis With IV Contrast Routine Oral Contrast; Future; Expected date: 01/28/2025    Generalized abdominal pain  - schedule EGD, discussed procedure with patient, including risks and benefits, patient verbalized understanding  - schedule Colonoscopy, discussed procedure with the patient, including risks and benefits, patient verbalized understanding  - REFILL: dicyclomine (BENTYL) 10 MG capsule; Take 1 capsule (10 mg total) by mouth 4 (four) times daily as needed (abdominal cramping).  Dispense: 360 capsule; Refill: 0  -     CT Abdomen Pelvis With IV Contrast Routine Oral Contrast; Future; Expected date: 01/28/2025    Abdominal cramping  - schedule Colonoscopy, discussed procedure with the patient, including risks and benefits, patient verbalized understanding  - REFILL: dicyclomine (BENTYL) 10 MG capsule; Take 1 capsule (10 mg total) by mouth 4 (four) times daily as needed (abdominal cramping).  Dispense: 360 capsule; Refill: 0    Intermittent diarrhea & Irregular bowel habits  - schedule Colonoscopy, discussed procedure with the  patient, including risks and benefits, patient verbalized understanding  - avoid lactose, alcohol, & caffeine  - avoid known triggers  -  increase fiber in diet, especially soluble fiber since this can help bulk up the stool consistency and may help to slow down how fast the stool goes through the colon and can prevent diarrhea  -     H. pylori antigen, stool; Future; Expected date: 01/28/2025  -     Giardia / Cryptosporidum, EIA; Future; Expected date: 01/28/2025  -     Stool Exam-Ova,Cysts,Parasites; Future; Expected date: 01/28/2025  -     Clostridium difficile EIA; Future; Expected date: 01/28/2025  -     Stool culture; Future; Expected date: 01/28/2025  -     pH, stool; Future; Expected date: 01/28/2025  -REFILL: dicyclomine (BENTYL) 10 MG capsule; Take 1 capsule (10 mg total) by mouth 4 (four) times daily as needed (abdominal cramping).  Dispense: 360 capsule; Refill: 0    Gastroesophageal reflux disease, unspecified whether esophagitis present  - schedule EGD, discussed procedure with patient, including risks and benefits, patient verbalized understanding  -Avoid large meals, avoid eating within 2-3 hours of bedtime (avoid late night eating & lying down soon after eating), elevate head of bed if nocturnal symptoms are present, smoking cessation (if current smoker), & weight loss (if overweight).   -Avoid known foods which trigger reflux symptoms & to minimize/avoid high-fat foods, chocolate, caffeine, citrus, alcohol, & tomato products.  -Avoid/limit use of NSAID's, since they can cause GI upset, bleeding, and/or ulcers. If needed, take with food.  -START: pantoprazole (PROTONIX) 40 MG tablet; Take 1 tablet (40 mg total) by mouth once daily.  Dispense: 90 tablet; Refill: 0  - take TUMS p.r.n.    Nausea  - schedule EGD, discussed procedure with patient, including risks and benefits, patient verbalized understanding  - START: pantoprazole (PROTONIX) 40 MG tablet; Take 1 tablet (40 mg total) by mouth once daily.   Dispense: 90 tablet; Refill: 0  -     H. pylori antigen, stool; Future; Expected date: 01/28/2025  -     CT Abdomen Pelvis With IV Contrast Routine Oral Contrast; Future; Expected date: 01/28/2025    Weight loss  - schedule EGD, discussed procedure with patient, including risks and benefits, patient verbalized understanding  - schedule Colonoscopy, discussed procedure with the patient, including risks and benefits, patient verbalized understanding  -     CT Abdomen Pelvis With IV Contrast Routine Oral Contrast; Future; Expected date: 01/28/2025  - encouraged PO intake and daily calorie counts to ensure adequate nutrition is taken in, recommend at least 2,000 calories a day  - recommend nutritional drinks, such as Boost, Ensure or Glucerna, to supplement nutrition needs    Follow up in about 4 weeks (around 2/25/2025), or if symptoms worsen or fail to improve.      If no improvement in symptoms or symptoms worsen, call/follow-up at clinic or go to ER.        Total time spent on the encounter includes face to face time and non-face to face time preparing to see the patient (eg, review of tests), Obtaining and/or reviewing separately obtained history, Documenting clinical information in the electronic or other health record, Independently interpreting results (not separately reported) and communicating results to the patient/family/caregiver, or Care coordination (not separately reported).     A dictation software program was used for this note. Please expect some simple typographical  errors in this note.

## 2025-02-12 ENCOUNTER — HOSPITAL ENCOUNTER (OUTPATIENT)
Dept: RADIOLOGY | Facility: HOSPITAL | Age: 25
Discharge: HOME | End: 2025-02-12
Payer: COMMERCIAL

## 2025-02-12 ENCOUNTER — TELEPHONE (OUTPATIENT)
Dept: GASTROENTEROLOGY | Facility: CLINIC | Age: 25
End: 2025-02-12
Payer: COMMERCIAL

## 2025-02-12 DIAGNOSIS — R10.12 LUQ PAIN: ICD-10-CM

## 2025-02-12 DIAGNOSIS — R63.4 WEIGHT LOSS: ICD-10-CM

## 2025-02-12 DIAGNOSIS — R10.84 GENERALIZED ABDOMINAL PAIN: ICD-10-CM

## 2025-02-12 DIAGNOSIS — R11.0 NAUSEA: ICD-10-CM

## 2025-02-12 PROCEDURE — 25500020 PHARM REV CODE 255: Mod: PO

## 2025-02-12 PROCEDURE — A9698 NON-RAD CONTRAST MATERIALNOC: HCPCS | Mod: PO

## 2025-02-12 PROCEDURE — 74177 CT ABD & PELVIS W/CONTRAST: CPT | Mod: 26,,, | Performed by: RADIOLOGY

## 2025-02-12 PROCEDURE — 74177 CT ABD & PELVIS W/CONTRAST: CPT | Mod: TC,PO

## 2025-02-12 RX ADMIN — IOHEXOL 75 ML: 350 INJECTION, SOLUTION INTRAVENOUS at 01:02

## 2025-02-12 RX ADMIN — BARIUM SULFATE 900 ML: 20 SUSPENSION ORAL at 01:02

## 2025-02-12 NOTE — TELEPHONE ENCOUNTER
----- Message from Amita sent at 2/12/2025 12:01 PM CST -----  Contact: Stephenie from Dr Melo  Type:  Needs Medical Advice    Who Called:   Stephenie from Dr Melo    Would the patient rather a call back or a response via [employer]?   Call back  Best Call Back Number:    [phone] - Stephenie    Additional Information:   States she is calling to confirm receipt of their referral for this patient - please call - thank you

## 2025-02-19 ENCOUNTER — OFFICE VISIT (OUTPATIENT)
Dept: OBSTETRICS AND GYNECOLOGY | Facility: CLINIC | Age: 25
End: 2025-02-19
Payer: COMMERCIAL

## 2025-02-19 VITALS
HEIGHT: 62 IN | WEIGHT: 125 LBS | SYSTOLIC BLOOD PRESSURE: 96 MMHG | BODY MASS INDEX: 23 KG/M2 | DIASTOLIC BLOOD PRESSURE: 62 MMHG

## 2025-02-19 DIAGNOSIS — N83.201 RIGHT OVARIAN CYST: Primary | ICD-10-CM

## 2025-02-19 RX ORDER — ASCORBIC ACID 250 MG
TABLET,CHEWABLE ORAL
COMMUNITY
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
## 2025-02-27 ENCOUNTER — LAB VISIT (OUTPATIENT)
Dept: LAB | Facility: HOSPITAL | Age: 25
End: 2025-02-27
Payer: COMMERCIAL

## 2025-02-27 DIAGNOSIS — R10.12 LUQ PAIN: ICD-10-CM

## 2025-02-27 DIAGNOSIS — R19.7 INTERMITTENT DIARRHEA: ICD-10-CM

## 2025-02-27 DIAGNOSIS — R11.0 NAUSEA: ICD-10-CM

## 2025-02-27 LAB
C DIFF GDH STL QL: NEGATIVE
C DIFF TOX A+B STL QL IA: NEGATIVE

## 2025-02-27 PROCEDURE — 83986 ASSAY PH BODY FLUID NOS: CPT

## 2025-02-27 PROCEDURE — 87045 FECES CULTURE AEROBIC BACT: CPT

## 2025-02-27 PROCEDURE — 87324 CLOSTRIDIUM AG IA: CPT

## 2025-02-27 PROCEDURE — 87427 SHIGA-LIKE TOXIN AG IA: CPT | Mod: 59

## 2025-02-27 PROCEDURE — 87329 GIARDIA AG IA: CPT

## 2025-02-27 PROCEDURE — 87046 STOOL CULTR AEROBIC BACT EA: CPT

## 2025-02-27 PROCEDURE — 87338 HPYLORI STOOL AG IA: CPT

## 2025-02-27 PROCEDURE — 87209 SMEAR COMPLEX STAIN: CPT

## 2025-02-27 PROCEDURE — 87449 NOS EACH ORGANISM AG IA: CPT | Mod: 91

## 2025-02-28 LAB
CRYPTOSP AG STL QL IA: NEGATIVE
G LAMBLIA AG STL QL IA: NEGATIVE
H.PYLORI ANTIGEN INTERPRETATION (OHS): NEGATIVE

## 2025-03-01 LAB
E COLI SXT1 STL QL IA: NEGATIVE
E COLI SXT2 STL QL IA: NEGATIVE

## 2025-03-03 LAB
BACTERIA STL CULT: NORMAL
PH STL: 5 [PH] (ref 5–8.5)

## 2025-03-06 LAB — O+P STL MICRO: NORMAL

## 2025-03-07 ENCOUNTER — RESULTS FOLLOW-UP (OUTPATIENT)
Dept: GASTROENTEROLOGY | Facility: CLINIC | Age: 25
End: 2025-03-07

## 2025-03-21 ENCOUNTER — PATIENT MESSAGE (OUTPATIENT)
Dept: GASTROENTEROLOGY | Facility: CLINIC | Age: 25
End: 2025-03-21
Payer: COMMERCIAL

## 2025-03-24 ENCOUNTER — RESULTS FOLLOW-UP (OUTPATIENT)
Dept: OBSTETRICS AND GYNECOLOGY | Facility: CLINIC | Age: 25
End: 2025-03-24

## 2025-03-24 ENCOUNTER — HOSPITAL ENCOUNTER (OUTPATIENT)
Dept: RADIOLOGY | Facility: HOSPITAL | Age: 25
Discharge: HOME | End: 2025-03-24
Payer: COMMERCIAL

## 2025-03-24 DIAGNOSIS — N83.201 RIGHT OVARIAN CYST: ICD-10-CM

## 2025-03-24 PROCEDURE — 76856 US EXAM PELVIC COMPLETE: CPT | Mod: 26,,, | Performed by: RADIOLOGY

## 2025-03-24 PROCEDURE — 76830 TRANSVAGINAL US NON-OB: CPT | Mod: 26,,, | Performed by: RADIOLOGY

## 2025-03-24 PROCEDURE — 76830 TRANSVAGINAL US NON-OB: CPT | Mod: TC,PN

## 2025-04-10 ENCOUNTER — RESULTS FOLLOW-UP (OUTPATIENT)
Dept: GASTROENTEROLOGY | Facility: CLINIC | Age: 25
End: 2025-04-10

## 2025-04-10 NOTE — PROGRESS NOTES
Please inform the patient that all biopsies are benign. There is no evidence of eosinophilic esophagitis, Helicobacter pylori, or Celiac disease. Continue current medications and follow up as previously planned.

## 2025-04-17 ENCOUNTER — OFFICE VISIT (OUTPATIENT)
Dept: GASTROENTEROLOGY | Facility: CLINIC | Age: 25
End: 2025-04-17
Payer: COMMERCIAL

## 2025-04-17 ENCOUNTER — HOSPITAL ENCOUNTER (OUTPATIENT)
Dept: RADIOLOGY | Facility: HOSPITAL | Age: 25
Discharge: HOME | End: 2025-04-17
Payer: COMMERCIAL

## 2025-04-17 ENCOUNTER — RESULTS FOLLOW-UP (OUTPATIENT)
Dept: GASTROENTEROLOGY | Facility: CLINIC | Age: 25
End: 2025-04-17

## 2025-04-17 VITALS — WEIGHT: 123.69 LBS | HEIGHT: 62 IN | BODY MASS INDEX: 22.76 KG/M2

## 2025-04-17 DIAGNOSIS — R63.4 WEIGHT LOSS: ICD-10-CM

## 2025-04-17 DIAGNOSIS — T78.1XXA GASTROINTESTINAL FOOD SENSITIVITY: ICD-10-CM

## 2025-04-17 DIAGNOSIS — Z98.890 HISTORY OF COLONOSCOPY: ICD-10-CM

## 2025-04-17 DIAGNOSIS — R68.81 EARLY SATIETY: ICD-10-CM

## 2025-04-17 DIAGNOSIS — K58.2 IRRITABLE BOWEL SYNDROME WITH BOTH CONSTIPATION AND DIARRHEA: ICD-10-CM

## 2025-04-17 DIAGNOSIS — R10.84 GENERALIZED ABDOMINAL PAIN: ICD-10-CM

## 2025-04-17 DIAGNOSIS — R14.0 ABDOMINAL BLOATING: ICD-10-CM

## 2025-04-17 DIAGNOSIS — Z98.890 HISTORY OF ESOPHAGOGASTRODUODENOSCOPY (EGD): Primary | ICD-10-CM

## 2025-04-17 DIAGNOSIS — M25.50 MULTIPLE JOINT PAIN: ICD-10-CM

## 2025-04-17 DIAGNOSIS — R43.2 ALTERED TASTE: ICD-10-CM

## 2025-04-17 PROCEDURE — 99999 PR PBB SHADOW E&M-EST. PATIENT-LVL IV: CPT | Mod: PBBFAC,,,

## 2025-04-17 PROCEDURE — 3008F BODY MASS INDEX DOCD: CPT | Mod: CPTII,S$GLB,,

## 2025-04-17 PROCEDURE — 99214 OFFICE O/P EST MOD 30 MIN: CPT | Mod: S$GLB,,,

## 2025-04-17 PROCEDURE — 74018 RADEX ABDOMEN 1 VIEW: CPT | Mod: 26,,, | Performed by: RADIOLOGY

## 2025-04-17 PROCEDURE — 1160F RVW MEDS BY RX/DR IN RCRD: CPT | Mod: CPTII,S$GLB,,

## 2025-04-17 PROCEDURE — 74018 RADEX ABDOMEN 1 VIEW: CPT | Mod: TC,FY,PO

## 2025-04-17 PROCEDURE — 1159F MED LIST DOCD IN RCRD: CPT | Mod: CPTII,S$GLB,,

## 2025-04-17 NOTE — PROGRESS NOTES
Subjective:       Patient ID: Sherry Green is a 25 y.o. female Body mass index is 22.62 kg/m².    Chief Complaint: Follow-up and Irritable Bowel Syndrome    Established patient of Dr. Youngblood and myself  Referred from Dr. Melo for vitamin D deficiency.     Patient following up after scopes to discuss continued GI symptoms.    GI Problem  The primary symptoms include weight loss (was initially intentional, but now she has a decreased appetite (eats 1 large meal throughout the day & snacks; ADHD meds have also changed appetite (started medication 08/2024)), abdominal pain, nausea (improved since starting Protonix 40 mg once daily; eating certain foods such as gluten causes nausea) and diarrhea. Primary symptoms do not include fever, fatigue, vomiting, melena, hematemesis, jaundice, hematochezia or dysuria.   The abdominal pain began more than 2 days ago (chronic - at times feels something (possible gas/stool) moving through her abdomen, which triggers pain). The abdominal pain has been unchanged since its onset. The abdominal pain is located in the periumbilical region. The abdominal pain radiates to the LLQ. The severity of the abdominal pain is 3/10 (Constant cramp that worsens with palpation or after eating certain foods (pizza, too much bread, too much dairy, or certain eggs)). Relieved by: Tries to avoid known triggers, but sometimes still experiencing pain; takes Bentyl 10 mg p.r.n. for severe abdominal pain with improvement.   The diarrhea began more than 1 week ago (Improved since starting Bentyl p.r.n. in the morning and at lunch and implementing low FODMAP diet - fluctuations in stool texture for years; Stool studies negative 02/27/2025). The diarrhea is watery and semi-solid (Varies between 4 and 7 on Cheshire scale - rated 4 this morning). The diarrhea occurs once per day. Risk factors for illness producing diarrhea include recent antibiotic use (URI 12/24 treated with antibiotics; denies suspect  food, recent foreign travel).   The illness is also significant for bloating (severe after eating gluten) and constipation (at times skips up to 2-3 days without BMs). The illness does not include chills, dysphagia or odynophagia. Associated symptoms comments: Patient reports history of vitamin-D deficiency, with leg and knee aches, and with multiple joint pain - concerned about possible malabsorption, connective tissue disease, or mast cell activation syndrome. Being  followed by Rheumatology to investigate possible autoimmune disorder. Also has recently developed change in taste the past few months - she starts even water is distasteful. Significant associated medical issues include GERD (Improved since startin protonix 40 mg once daily - relux at night has resolved; past tx: Pepto-Bismol p.r.n.; heartburn triggers stress) and irritable bowel syndrome. Associated medical issues do not include inflammatory bowel disease, gallstones, liver disease, alcohol abuse, PUD, gastric bypass, bowel resection, hemorrhoids or diverticulitis.     Review of Systems   Constitutional:  Positive for appetite change, unexpected weight change and weight loss (was initially intentional, but now she has a decreased appetite (eats 1 large meal throughout the day & snacks; ADHD meds have also changed appetite (started medication 08/2024)). Negative for activity change, chills, diaphoresis, fatigue and fever.   HENT:  Negative for sore throat and trouble swallowing.    Respiratory:  Negative for cough, choking and shortness of breath.    Cardiovascular:  Negative for chest pain.   Gastrointestinal:  Positive for abdominal pain, bloating (severe after eating gluten), constipation (at times skips up to 2-3 days without BMs), diarrhea and nausea (improved since starting Protonix 40 mg once daily; eating certain foods such as gluten causes nausea). Negative for abdominal distention, anal bleeding, blood in stool, dysphagia, hematemesis,  hematochezia, jaundice, melena, rectal pain and vomiting.   Genitourinary:  Negative for dysuria.       Patient's last menstrual period was 04/12/2025 (exact date).  Past Medical History:   Diagnosis Date    ADHD     H/O cold sores     Vitamin D insufficiency      Past Surgical History:   Procedure Laterality Date    COLONOSCOPY N/A 03/25/2025    Procedure: COLONOSCOPY;  Surgeon: Cristian Youngblood DO;  Location: [facility] ENDO;  Service: Gastroenterology;  Laterality: N/A;    ESOPHAGOGASTRODUODENOSCOPY N/A 03/25/2025    Procedure: EGD (ESOPHAGOGASTRODUODENOSCOPY);  Surgeon: Cristian Youngblood DO;  Location: [facility] ENDO;  Service: Gastroenterology;  Laterality: N/A;    shoulder surgery Right     UPPER GASTROINTESTINAL ENDOSCOPY       Family History   Problem Relation Name Age of Onset    Other (connective tissue disorder) Mother      Lupus Father      Other (pulmonary htn) Maternal Grandmother          passed at age 27    Ulcerative colitis Paternal Grandmother      Breast cancer Neg Hx      Ovarian cancer Neg Hx      Colon cancer Neg Hx      Esophageal cancer Neg Hx      Stomach cancer Neg Hx      Crohn's disease Neg Hx      Colon polyps Neg Hx       Social History     Tobacco Use    Smoking status: Never     Passive exposure: Never    Smokeless tobacco: Never   Substance Use Topics    Alcohol use: Never    Drug use: Never     Wt Readings from Last 10 Encounters:   04/17/25 56.1 kg (123 lb 10.9 oz)   03/25/25 57 kg (125 lb 10.6 oz)   02/19/25 56.7 kg (125 lb)   01/28/25 55.3 kg (121 lb 14.6 oz)   12/10/24 55.8 kg (123 lb)   11/06/24 56.2 kg (123 lb 14.4 oz)   10/24/24 56.7 kg (125 lb)   03/20/24 72.6 kg (160 lb)   11/17/22 63.5 kg (140 lb)   05/13/22 63.5 kg (140 lb)     Lab Results   Component Value Date    WBC 9.83 03/20/2024    HGB 14.3 03/20/2024    HCT 40.4 03/20/2024    MCV 86 03/20/2024     03/20/2024     CMP  Sodium   Date Value Ref Range Status   03/20/2024 138 136 - 145 mmol/L Final     Potassium    Date Value Ref Range Status   03/20/2024 3.7 3.5 - 5.1 mmol/L Final     Comment:     Anion Gap reference range revised on 4/28/2023     Chloride   Date Value Ref Range Status   03/20/2024 105 95 - 110 mmol/L Final     CO2   Date Value Ref Range Status   03/20/2024 25 22 - 31 mmol/L Final     Glucose   Date Value Ref Range Status   03/20/2024 115 (H) 70 - 110 mg/dL Final     Comment:     The ADA recommends the following guidelines for fasting glucose:    Normal:       less than 100 mg/dL    Prediabetes:  100 mg/dL to 125 mg/dL    Diabetes:     126 mg/dL or higher       BUN   Date Value Ref Range Status   03/20/2024 10 7 - 18 mg/dL Final     Creatinine   Date Value Ref Range Status   03/20/2024 0.65 0.50 - 1.40 mg/dL Final     Calcium   Date Value Ref Range Status   03/20/2024 9.7 8.4 - 10.2 mg/dL Final     Total Protein   Date Value Ref Range Status   03/20/2024 7.9 6.0 - 8.4 g/dL Final     Albumin   Date Value Ref Range Status   03/20/2024 4.8 3.5 - 5.2 g/dL Final     Total Bilirubin   Date Value Ref Range Status   03/20/2024 1.0 0.2 - 1.3 mg/dL Final     Alkaline Phosphatase   Date Value Ref Range Status   03/20/2024 63 38 - 145 U/L Final     AST   Date Value Ref Range Status   03/20/2024 31 14 - 36 U/L Final     ALT   Date Value Ref Range Status   03/20/2024 18 0 - 35 U/L Final     Anion Gap   Date Value Ref Range Status   03/20/2024 8 5 - 12 mmol/L Final     Comment:     Anion Gap reference range revised on 4/28/2023     Reviewed prior medical records including radiology report of chest x-ray 06/14/2023 & endoscopy history (see surgical history).    Objective:      Physical Exam  Vitals and nursing note reviewed.   Constitutional:       General: She is not in acute distress.     Appearance: Normal appearance. She is normal weight. She is not ill-appearing.   HENT:      Mouth/Throat:      Lips: Pink. No lesions.   Cardiovascular:      Pulses: Normal pulses.      Heart sounds: Normal heart sounds.   Pulmonary:       Effort: Pulmonary effort is normal. No respiratory distress.      Breath sounds: Normal breath sounds.   Abdominal:      General: Abdomen is flat. Bowel sounds are normal. There is no distension or abdominal bruit. There are no signs of injury.      Palpations: Abdomen is soft. There is no shifting dullness, fluid wave, hepatomegaly, splenomegaly or mass.      Tenderness: There is generalized abdominal tenderness. There is no guarding or rebound. Negative signs include Kaye's sign, Rovsing's sign and McBurney's sign.   Skin:     General: Skin is warm and dry.      Coloration: Skin is not jaundiced or pale.   Neurological:      Mental Status: She is alert and oriented to person, place, and time.   Psychiatric:         Attention and Perception: Attention normal.         Mood and Affect: Mood normal.         Speech: Speech normal.         Behavior: Behavior normal.         Assessment:       1. History of esophagogastroduodenoscopy (EGD)    2. History of colonoscopy    3. Irritable bowel syndrome with both constipation and diarrhea    4. Generalized abdominal pain    5. Gastrointestinal food sensitivity    6. Altered taste    7. Early satiety    8. Abdominal bloating    9. Weight loss    10. Multiple joint pain          Plan:       Discussed case with Dr. Youngblood.    History of esophagogastroduodenoscopy (EGD) & History of colonoscopy    - Informed patient of EGD and colonoscopy results, patient verbalized understanding    Irritable bowel syndrome with both constipation and diarrhea, Generalized abdominal pain, & Gastrointestinal food sensitivity  -  any medication we use to treat constipation can contribute to diarrhea and vice verse, it may take some time trying to figure out a good regimen to keep her bowel movements regulated to the patient's satisfaction/desire  - continue Bentyl p.r.n. as prescribed  - recommended continuing low FODMAP diet  - avoid known triggers  -     X-Ray Abdomen AP 1 View; Future;  Expected date: 04/17/2025    Altered taste  -     Ambulatory referral/consult to ENT; Future; Expected date: 04/24/2025    Early satiety  - continue small frequent meals instead of 3 big meals a day, low fat meals & low residue diet.  Avoid: high fiber (insoluble fiber), red meats, dairy, and non-digestible solids (peels, fruit pulp, etc). Avoid NSAIDs and narcotics.  -     NM Gastric Emptying; Future; Expected date: 04/17/2025    Abdominal bloating  - recommend OTC simethicone as directed, such as Phazyme or Gas-x  - recommend low gas diet: Reduce or eliminate these foods from your diet: Broccoli, Cauliflower, [place] sprouts, Cabbage, Cooked dried beans, Carbonated beverages (sparkling water, soda, beer, champagne)  Other Causes Of Excess Gas Include:   1) EATING TOO FAST or TALKING WHILE YOU CHEW may cause you to swallow air. This increases the amount of gas in the stomach and may worsen your symptoms.  --> Chew each mouthful completely before swallowing. Take your time.  2) OVEREATING may increase the feeling of being bloated and cause more gas.  --> When you are full, stop eating.  3) CONSTIPATION can increase the amount of normal intestinal gas.  --> Avoid constipation by increasing the amount of fiber in your diet by including whole cereal grains, fresh vegetables (except those in the above list) and fresh fruits. High-fiber foods absorb water and carry it out of the body. When increasing the amount of fiber in your diet, you also need to increase the amount of water that you drink. You should drink at least eight 8-ounce glasses of water (two quarts) per day.    Weight loss  - encouraged PO intake and daily calorie counts to ensure adequate nutrition is taken in, recommend at least 2,000 calories a day  - recommend nutritional drinks, such as Boost, Ensure or Glucerna, to supplement nutrition needs    Multiple joint pain   - recommend patient follow-up with Rheumatology and/or ortho for further evaluation  and management to rule out possible connective tissue diseases (family history of connective tissue disease in mother)    Follow up in about 2 months (around 6/17/2025), or if symptoms worsen or fail to improve.      If no improvement in symptoms or symptoms worsen, call/follow-up at clinic or go to ER.        Total time spent on the encounter includes face to face time and non-face to face time preparing to see the patient (eg, review of tests), Obtaining and/or reviewing separately obtained history, Documenting clinical information in the electronic or other health record, Independently interpreting results (not separately reported) and communicating results to the patient/family/caregiver, or Care coordination (not separately reported).     A dictation software program was used for this note. Please expect some simple typographical  errors in this note.

## 2025-04-28 ENCOUNTER — OFFICE VISIT (OUTPATIENT)
Dept: OTOLARYNGOLOGY | Facility: CLINIC | Age: 25
End: 2025-04-28
Payer: COMMERCIAL

## 2025-04-28 ENCOUNTER — LAB VISIT (OUTPATIENT)
Dept: LAB | Facility: HOSPITAL | Age: 25
End: 2025-04-28
Attending: STUDENT IN AN ORGANIZED HEALTH CARE EDUCATION/TRAINING PROGRAM
Payer: COMMERCIAL

## 2025-04-28 VITALS — BODY MASS INDEX: 23 KG/M2 | WEIGHT: 125 LBS | HEIGHT: 62 IN

## 2025-04-28 DIAGNOSIS — R43.2 ALTERED TASTE: ICD-10-CM

## 2025-04-28 DIAGNOSIS — T78.1XXA ORAL ALLERGY SYNDROME, INITIAL ENCOUNTER: ICD-10-CM

## 2025-04-28 DIAGNOSIS — R09.82 PND (POST-NASAL DRIP): ICD-10-CM

## 2025-04-28 DIAGNOSIS — E55.9 VITAMIN D DEFICIENCY: ICD-10-CM

## 2025-04-28 DIAGNOSIS — M85.80 OSTEOPENIA, UNSPECIFIED LOCATION: ICD-10-CM

## 2025-04-28 DIAGNOSIS — R43.2 ALTERED TASTE: Primary | ICD-10-CM

## 2025-04-28 LAB
25(OH)D3+25(OH)D2 SERPL-MCNC: 14 NG/ML (ref 30–96)
CALCIUM SERPL-MCNC: 9.5 MG/DL (ref 8.7–10.5)
IRON SATN MFR SERPL: 28 % (ref 20–50)
IRON SERPL-MCNC: 111 UG/DL (ref 30–160)
MAGNESIUM SERPL-MCNC: 2 MG/DL (ref 1.6–2.6)
PHOSPHATE SERPL-MCNC: 3.4 MG/DL (ref 2.7–4.5)
PTH-INTACT SERPL-MCNC: 52.7 PG/ML (ref 9–77)
TIBC SERPL-MCNC: 401 UG/DL (ref 250–450)
TRANSFERRIN SERPL-MCNC: 271 MG/DL (ref 200–375)
VIT B12 SERPL-MCNC: 209 PG/ML (ref 210–950)

## 2025-04-28 PROCEDURE — 84100 ASSAY OF PHOSPHORUS: CPT

## 2025-04-28 PROCEDURE — 86003 ALLG SPEC IGE CRUDE XTRC EA: CPT | Mod: 59,PO

## 2025-04-28 PROCEDURE — 84590 ASSAY OF VITAMIN A: CPT | Mod: PO

## 2025-04-28 PROCEDURE — 1159F MED LIST DOCD IN RCRD: CPT | Mod: CPTII,S$GLB,, | Performed by: STUDENT IN AN ORGANIZED HEALTH CARE EDUCATION/TRAINING PROGRAM

## 2025-04-28 PROCEDURE — 3008F BODY MASS INDEX DOCD: CPT | Mod: CPTII,S$GLB,, | Performed by: STUDENT IN AN ORGANIZED HEALTH CARE EDUCATION/TRAINING PROGRAM

## 2025-04-28 PROCEDURE — 86003 ALLG SPEC IGE CRUDE XTRC EA: CPT | Mod: PO

## 2025-04-28 PROCEDURE — 99204 OFFICE O/P NEW MOD 45 MIN: CPT | Mod: S$GLB,,, | Performed by: STUDENT IN AN ORGANIZED HEALTH CARE EDUCATION/TRAINING PROGRAM

## 2025-04-28 PROCEDURE — 84207 ASSAY OF VITAMIN B-6: CPT | Mod: PO

## 2025-04-28 PROCEDURE — 82306 VITAMIN D 25 HYDROXY: CPT

## 2025-04-28 PROCEDURE — 82310 ASSAY OF CALCIUM: CPT

## 2025-04-28 PROCEDURE — 83970 ASSAY OF PARATHORMONE: CPT

## 2025-04-28 PROCEDURE — 84630 ASSAY OF ZINC: CPT | Mod: PO

## 2025-04-28 PROCEDURE — 86003 ALLG SPEC IGE CRUDE XTRC EA: CPT | Mod: 59

## 2025-04-28 PROCEDURE — 84466 ASSAY OF TRANSFERRIN: CPT

## 2025-04-28 PROCEDURE — 84591 ASSAY OF NOS VITAMIN: CPT | Mod: PO

## 2025-04-28 PROCEDURE — 83735 ASSAY OF MAGNESIUM: CPT

## 2025-04-28 PROCEDURE — 82607 VITAMIN B-12: CPT

## 2025-04-28 PROCEDURE — 99999 PR PBB SHADOW E&M-EST. PATIENT-LVL III: CPT | Mod: PBBFAC,,, | Performed by: STUDENT IN AN ORGANIZED HEALTH CARE EDUCATION/TRAINING PROGRAM

## 2025-04-28 PROCEDURE — 36415 COLL VENOUS BLD VENIPUNCTURE: CPT | Mod: PO

## 2025-04-28 RX ORDER — AZELASTINE 1 MG/ML
1 SPRAY, METERED NASAL 2 TIMES DAILY
Qty: 30 ML | Refills: 11 | Status: SHIPPED | OUTPATIENT
Start: 2025-04-28 | End: 2026-04-28

## 2025-04-28 NOTE — PROGRESS NOTES
Otolaryngology Clinic Note    Subjective:       Patient ID: Sherry Green is a 25 y.o. female.    Chief Complaint: Consult (Altered taste)      History of Present Illness: Sherry Green is a 25 y.o. female presenting with recent dx IBS- C and D with taste changes noted. Since Nov, taste has been reducing. Lost almost 50 lbs in the fall quickly. She has liked sour, fermented foods but even that is not giving her normal taste or sensation. Tried lemon juice and fermented onions and still cannot taste. She has lost weight. Even water tastes bitter.   Has chronically low vit D for years and does not respond to supplementation long term. Had infusions in . Was bruising easily and having orthostatic issues. She has hypermobility concerns.   Needs gastric emptying test.   She has had facial swelling, allergic rxn issues several times. No food allergy testing. Fermented foods will trigger some reactions. Heavy gluten. Onions, peppers.   She denies regular nasal issues. Some PND when sick.   She does feel reflux with Gi issues. Taking protonic 40 mg daily.   Smell is normal.     She reports food sensitivities done in  and gluten protein was one, soy, tofu, lots of foods.   Has had + danielito, has osteopenia.     TSH was normal in Jan. Vit D was 18    Past Surgical History:   Procedure Laterality Date    COLONOSCOPY N/A 03/25/2025    Procedure: COLONOSCOPY;  Surgeon: Cristian Youngblood DO;  Location: [facility];  Service: Gastroenterology;  Laterality: N/A;    ESOPHAGOGASTRODUODENOSCOPY N/A 03/25/2025    Procedure: EGD (ESOPHAGOGASTRODUODENOSCOPY);  Surgeon: Cristian Youngblood DO;  Location: [facility];  Service: Gastroenterology;  Laterality: N/A;    shoulder surgery Right     UPPER GASTROINTESTINAL ENDOSCOPY       Past Medical History:   Diagnosis Date    ADHD     H/O cold sores     Vitamin D insufficiency      Social Drivers of Health     Tobacco Use: Low Risk  (4/17/2025)    Patient History     Smoking  Tobacco Use: Never     Smokeless Tobacco Use: Never     Passive Exposure: Never   Alcohol Use: Not on file   Financial Resource Strain: Not on file   Food Insecurity: Not on file   Transportation Needs: Not on file   Physical Activity: Not on file   Stress: Not on file   Housing Stability: Not on file   Depression: Not on file   Utilities: Not on file   Health Literacy: Not on file   Social Isolation: Not on file     Review of patient's allergies indicates:   Allergen Reactions    Gluten protein Other (See Comments)     Current Outpatient Medications   Medication Instructions    ascorbic acid, vitamin C, (VITAMIN C) 250 mg Chew Take by mouth.    azelastine (ASTELIN) 137 mcg, Nasal, 2 times daily    cetirizine (ZYRTEC) 10 mg    diclofenac sodium (VOLTAREN ARTHRITIS PAIN) 1 % Gel APPLY 2 GRAMS TO THE AFFECTED AREA(S) BY TOPICAL ROUTE 4 TIMES PER DAY    dicyclomine (BENTYL) 10 mg, Oral, 4 times daily PRN    ergocalciferol (ERGOCALCIFEROL) 50,000 Units    pantoprazole (PROTONIX) 40 mg, Oral, Daily    valACYclovir (VALTREX) 1,000 mg, 2 times daily    XELSTRYM 9 mg/9 hour PT24 1 patch         ENT ROS negative except as stated above.     Patient answers are not available for this visit.            Objective:      There were no vitals filed for this visit.    General: NAD, well appearing  Eyes: Normal conjunctiva and lids  Face: symmetric, nerve intact  Nose: The nose is without any evidence of any deformity. The nasal mucosa is moist. The septum is midline. There is no evidence of septal hematoma. The turbinates are without abnormality.   Ears: The ears are with normal-appearing pinna. Examination of the canals is normal appearing bilaterally. There is no drainage or erythema noted. The tympanic membranes are normal appearing with pearly color, normal-appearing landmarks and normal light reflex. Hearing is grossly intact.  Mouth: No obvious abnormalities to the lips. The teeth are unremarkable. The gingivae are without any  obvious evidence of infection or lesion. The oral mucosa is moist and pink. There are no obvious masses to the hard or soft palate.   Oropharynx: The uvula is midline.  The tongue is midline. The posterior pharynx  has PND. The tonsils are normal appearing.  Salivary glands: The salivary glands are symmetric and not enlarged, no masses  Neck: No lymphadenopathy, trachea midline, thryoid not enlarged.  Psych: Normal mood and affect.   Neuro: Grossly intact  Speech: fluent    Test Review: I personally reviewed EGD    Findings:       A single area of ectopic gastric mucosa was found in the upper third        of the esophagus.        Otherwise normal mucosa was found in the entire esophagus.        The Z-line was regular and was found 36 cm from the incisors.        Biopsies were obtained from the proximal and distal esophagus with        cold forceps for histology of suspected eosinophilic esophagitis.        Patchy mildly erythematous mucosa without bleeding was found in the        gastric body and in the gastric antrum. Biopsies were taken with a        cold forceps for Helicobacter pylori testing.        The cardia and gastric fundus were normal on retroflexion.        The ampulla, duodenal bulb, first portion of the duodenum and second        portion of the duodenum were normal. Biopsies for histology were        taken with a cold forceps for evaluation of celiac disease.   Impression:            - Normal mucosa was found in the entire esophagus.                          - Z-line regular, 36 cm from the incisors.                          - Erythematous mucosa in the gastric body and                          antrum. Biopsied.                          - Normal duodenal bulb, first portion of the                          duodenum and second portion of the duodenum.                          Biopsied.                          - Biopsies were taken with a cold forceps for                          evaluation of  eosinophilic esophagitis.     03/26/2025 L:janine     1. SECOND PORTION OF DUODENUM BIOPSIES:     - NORMAL.     2. DUODENAL BULB BIOPSIES:     - NORMAL DUODENAL MUCOSA.     3. GASTRIC ANTRAL AND BODY BIOPSIES:     - NORMAL.     - NO HELICOBACTER SEEN.     4. DISTAL ESOPHAGEAL BIOPSIES:     - NORMAL SQUAMOUS MUCOSA.     5. PROXIMAL ESOPHAGEAL BIOPSIES:     - NORMAL SQUAMOUS MUCOSA.      Assessment and Plan:       1. Altered taste    2. Vitamin D deficiency    3. Osteopenia, unspecified location    4. Oral allergy syndrome, initial encounter    5. PND (post-nasal drip)        Check zinc, B12, A, D, B6, niacin, iron,    Will get allergy testing for a few triggers for her facial swelling and stomach issues     Could try probiotics, alpha-lipoic acid, multivitamin with the above.     She is working on IBS control with GI.     Astelin 1-2 x daily for PND    Check PTH, calcium, vit D, mag, phos for vit D and bone concerns.     May need to see allergy/immunology eventually. No hypermobility specialists in this area per rheum.     RTC: contact with labs.     Plan of care was discussed in detail with the patient, who agreed with the plan as above. All questions were answered in detail.     Moon Randall MD  Otolaryngology

## 2025-04-29 ENCOUNTER — RESULTS FOLLOW-UP (OUTPATIENT)
Dept: OTOLARYNGOLOGY | Facility: CLINIC | Age: 25
End: 2025-04-29

## 2025-05-01 LAB
Lab: <0.1 KU/L
Lab: <0.1 KU/L
Lab: NORMAL
Lab: NORMAL
W ONION , CLASS: NORMAL
W ONION , IGE: <0.1 KU/L
W TOMATO , CLASS: NORMAL
W TOMATO , IGE: <0.1 KU/L
W WHEAT, CLASS: NORMAL
W WHEAT, IGE: <0.1 KU/L
W ZINC: 80 UG/DL

## 2025-05-02 LAB
NIACIN SERPL-MCNC: <5 NG/ML
NICOTINAMIDE SERPL-MCNC: 15.4 NG/ML (ref 5–48)
NICOTINURATE SERPL-MCNC: <5 NG/ML
W VITAMIN A: 56 UG/DL
W VITAMIN B6: 6 UG/L

## 2025-05-19 ENCOUNTER — OFFICE VISIT (OUTPATIENT)
Dept: URGENT CARE | Facility: CLINIC | Age: 25
End: 2025-05-19
Payer: COMMERCIAL

## 2025-05-19 VITALS
WEIGHT: 125 LBS | DIASTOLIC BLOOD PRESSURE: 76 MMHG | OXYGEN SATURATION: 99 % | HEART RATE: 96 BPM | HEIGHT: 62 IN | TEMPERATURE: 99 F | SYSTOLIC BLOOD PRESSURE: 104 MMHG | BODY MASS INDEX: 23 KG/M2

## 2025-05-19 DIAGNOSIS — M25.511 RIGHT SHOULDER PAIN, UNSPECIFIED CHRONICITY: Primary | ICD-10-CM

## 2025-05-19 PROCEDURE — 73030 X-RAY EXAM OF SHOULDER: CPT | Mod: RT,S$GLB,, | Performed by: RADIOLOGY

## 2025-05-19 PROCEDURE — 99214 OFFICE O/P EST MOD 30 MIN: CPT | Mod: S$GLB,,, | Performed by: PHYSICIAN ASSISTANT

## 2025-05-20 NOTE — PROGRESS NOTES
"Subjective:      Patient ID: Sherry Green is a 25 y.o. female.    Vitals:  height is 5' 2" (1.575 m) and weight is 56.7 kg (125 lb). Her oral temperature is 98.5 °F (36.9 °C). Her blood pressure is 104/76 and her pulse is 96. Her oxygen saturation is 99%.     Chief Complaint: Shoulder Pain    Pt presents today with c/o right shoulder pain x's Pt states that she was typing and heard something pop and she's been having excessive ROM, pt states that she had surgery that limited her ROM. Pt also states that the shoulder and arm feels like its catching and grinding. Pt has no known injury mechanism.  Pain level 3/10.    Shoulder Pain   The pain is present in the right shoulder. This is a new problem. The pain is at a severity of 3/10. The pain is mild. Associated symptoms include itching, joint locking, joint swelling, numbness and tingling. Pertinent negatives include no fever, headaches, limited range of motion, stiffness or visual symptoms. She has tried nothing for the symptoms. The treatment provided no relief. Her past medical history is significant for Injuries to Extremity. There is no history of diabetes or migraines.       Constitution: Negative for fever.   Musculoskeletal:  Negative for abnormal ROM of joint.   Neurological:  Positive for numbness. Negative for headaches.      Objective:     Physical Exam   Constitutional: She is oriented to person, place, and time. She appears well-developed. She is cooperative.   HENT:   Head: Normocephalic and atraumatic.   Ears:   Right Ear: Hearing, tympanic membrane, external ear and ear canal normal.   Left Ear: Hearing, tympanic membrane, external ear and ear canal normal.   Nose: Nose normal. No mucosal edema or nasal deformity. No epistaxis. Right sinus exhibits no maxillary sinus tenderness and no frontal sinus tenderness. Left sinus exhibits no maxillary sinus tenderness and no frontal sinus tenderness.   Mouth/Throat: Uvula is midline, oropharynx is clear and " moist and mucous membranes are normal. No trismus in the jaw. Normal dentition. No uvula swelling.   Eyes: Conjunctivae and lids are normal.   Neck: Trachea normal and phonation normal. Neck supple.   Cardiovascular: Normal rate, regular rhythm, normal heart sounds and normal pulses.   Pulmonary/Chest: Effort normal and breath sounds normal. No accessory muscle usage or stridor. She has no wheezes.   Abdominal: Normal appearance and bowel sounds are normal. Soft.   Musculoskeletal: Normal range of motion.         General: No edema. Normal range of motion.      Right shoulder: She exhibits tenderness.      Left shoulder: Normal.   Neurological: She is alert and oriented to person, place, and time. She exhibits normal muscle tone.   Skin: Skin is warm, dry and intact.   Psychiatric: Her speech is normal and behavior is normal. Judgment and thought content normal.   Nursing note and vitals reviewed.    X-ray Shoulder 2 or More Views Right  Result Date: 5/19/2025  EXAMINATION: XR SHOULDER COMPLETE 2 OR MORE VIEWS RIGHT CLINICAL HISTORY: Pain in right shoulder TECHNIQUE: Two or three views of the right shoulder were performed. COMPARISON: 05/13/2022. FINDINGS: No acute fracture or dislocation.  Alignment is normal.  Joint spaces are preserved.  Again seen is a sclerotic density overlying the glenoid, may represent a bone island or a joint body, unchanged.     No acute osseous abnormality of the shoulder. Electronically signed by: Paul Barone Date:    05/19/2025 Time:    19:32        Assessment:     1. Right shoulder pain, unspecified chronicity        Plan:    Discussed XRAY results with patient. Advised close follow-up with PCP and/or Specialist for further evaluation as needed. ER precautions given to patient as well. Patient aware, verbalized understanding and agreed with plan of care.    Right shoulder pain, unspecified chronicity  -     X-ray Shoulder 2 or More Views Right; Future; Expected date:  05/19/2025

## 2025-05-20 NOTE — PATIENT INSTRUCTIONS
If you were prescribed a narcotic or controlled medication, do not drive or operate heavy equipment or machinery while taking these medications.  You must understand that you've received an Urgent Care treatment only and that you may be released before all your medical problems are known or treated. You, the patient, will arrange for follow up care as instructed.  Follow up with your PCP or specialty clinic as directed if not improved or as needed. You can call [phone] to schedule an appointment with the appropriate provider.  If your condition worsens we recommend that you receive another evaluation at the Emergency Department for any concerns or worsening of condition.  Patient aware and verbalized understanding.    Discussed XRAY results with patient.  Patient aware and verbalized understanding.    Rest, Ice, Compression and Elevation. Can alternate with warm soaks/epsom salt as needed.  OTC Ibuprofen or Tylenol UNLESS CONTRAINDICATED (KIDNEY AND/OR LIVER ISSUES, ETC.) every 4-6 hours for pain as needed.  Follow-up with PCP for further evaluation as needed.  Follow-up with Ortho for further evaluation as needed.  Strict ER precautions given to patient.  Patient aware and verbalized understanding.

## 2025-07-23 ENCOUNTER — TELEPHONE (OUTPATIENT)
Dept: GASTROENTEROLOGY | Facility: CLINIC | Age: 25
End: 2025-07-23

## 2025-07-23 ENCOUNTER — OFFICE VISIT (OUTPATIENT)
Dept: GASTROENTEROLOGY | Facility: CLINIC | Age: 25
End: 2025-07-23
Payer: COMMERCIAL

## 2025-07-23 ENCOUNTER — PATIENT MESSAGE (OUTPATIENT)
Dept: RADIOLOGY | Facility: HOSPITAL | Age: 25
End: 2025-07-23
Payer: COMMERCIAL

## 2025-07-23 VITALS — WEIGHT: 128.31 LBS | HEIGHT: 62 IN | BODY MASS INDEX: 23.61 KG/M2

## 2025-07-23 DIAGNOSIS — R14.0 ABDOMINAL BLOATING: ICD-10-CM

## 2025-07-23 DIAGNOSIS — E55.9 VITAMIN D DEFICIENCY: ICD-10-CM

## 2025-07-23 DIAGNOSIS — K58.2 IRRITABLE BOWEL SYNDROME WITH BOTH CONSTIPATION AND DIARRHEA: Primary | ICD-10-CM

## 2025-07-23 DIAGNOSIS — K21.9 GASTROESOPHAGEAL REFLUX DISEASE WITHOUT ESOPHAGITIS: ICD-10-CM

## 2025-07-23 DIAGNOSIS — R22.0 FACIAL SWELLING: ICD-10-CM

## 2025-07-23 DIAGNOSIS — R68.81 EARLY SATIETY: ICD-10-CM

## 2025-07-23 DIAGNOSIS — R11.0 CHRONIC NAUSEA: ICD-10-CM

## 2025-07-23 DIAGNOSIS — E53.8 VITAMIN B12 DEFICIENCY: ICD-10-CM

## 2025-07-23 DIAGNOSIS — R10.9 ABDOMINAL CRAMPING: ICD-10-CM

## 2025-07-23 PROCEDURE — 99214 OFFICE O/P EST MOD 30 MIN: CPT | Mod: S$GLB,,,

## 2025-07-23 PROCEDURE — 1160F RVW MEDS BY RX/DR IN RCRD: CPT | Mod: CPTII,S$GLB,,

## 2025-07-23 PROCEDURE — 99999 PR PBB SHADOW E&M-EST. PATIENT-LVL IV: CPT | Mod: PBBFAC,,,

## 2025-07-23 PROCEDURE — 3008F BODY MASS INDEX DOCD: CPT | Mod: CPTII,S$GLB,,

## 2025-07-23 PROCEDURE — 1159F MED LIST DOCD IN RCRD: CPT | Mod: CPTII,S$GLB,,

## 2025-07-23 RX ORDER — GABAPENTIN 100 MG/1
CAPSULE ORAL
COMMUNITY

## 2025-07-23 RX ORDER — MAGNESIUM 200 MG
TABLET ORAL
COMMUNITY
Start: 2025-06-04

## 2025-07-23 RX ORDER — DICYCLOMINE HYDROCHLORIDE 10 MG/1
CAPSULE ORAL
COMMUNITY

## 2025-07-23 RX ORDER — ASPIRIN 325 MG
TABLET, DELAYED RELEASE (ENTERIC COATED) ORAL
COMMUNITY

## 2025-07-23 RX ORDER — HYDROXYZINE HYDROCHLORIDE 25 MG/1
TABLET, FILM COATED ORAL
COMMUNITY

## 2025-07-23 RX ORDER — FLUTICASONE PROPIONATE 50 MCG
SPRAY, SUSPENSION (ML) NASAL
COMMUNITY

## 2025-07-23 NOTE — PATIENT INSTRUCTIONS
Recommend daily exercise as tolerated, adequate water intake (six 8-oz glasses of water daily), and high fiber diet. OTC fiber supplements are recommended if diet does not reach daily fiber goal (20-30 grams daily), such as Metamucil, Citrucel, or FiberCon (take as directed, separate from other oral medications by >2 hours).  -Recommend taking an OTC stool softener such as Colace as directed to avoid hard stools and straining with bowel movements PRN  -Recommend trying OTC MiraLax once daily (17g PO) as directed

## 2025-07-23 NOTE — PROGRESS NOTES
Subjective:       Patient ID: Sherry Green is a 25 y.o. female Body mass index is 23.47 kg/m².    Chief Complaint: Follow-up and Abdominal Cramping    Established patient of Dr. Youngblood and myself  Referred from Dr. eMlo for vitamin D deficiency.     GI Problem  The primary symptoms include fatigue (Vitamin-D and vitamin B12 deficiency), abdominal pain, nausea (improved since starting Protonix 40 mg once daily; eating certain foods such as gluten causes nausea; feels full and nauseous - forgot to get GES) and diarrhea (Occurs once weekly with no specific triggers; takes Bentyl 10 mg p.r.n. with improvement; also reports her whole family had a GI bug about 3 weeks ago that caused nausea, diarrhea, bloating, and abdominal pain after eating ground meat). Primary symptoms do not include fever, weight loss, vomiting, melena, hematemesis, jaundice, hematochezia or dysuria.   The abdominal pain began more than 2 days ago (chronic - at times feels something (possible gas/stool) moving through her abdomen, which triggers pain). The abdominal pain has been unchanged since its onset. The abdominal pain is located in the periumbilical region. The abdominal pain radiates to the LLQ. The severity of the abdominal pain is 3/10 (Constant cramp that worsens with palpation or after eating certain foods (pizza, too much bread, too much dairy, or certain eggs)). Relieved by: takes Bentyl 10 mg p.r.n. for severe abdominal pain with improvement.   The diarrhea began more than 1 week ago (Improved since starting Bentyl p.r.n. in the morning and at lunch and implementing low FODMAP diet - fluctuations in stool texture for years; Stool studies negative 02/27/2025). The diarrhea is watery and semi-solid (Rated stool 6-7 on [place] scale once weekly during diarrheal episodes). The diarrhea occurs once per day. Risk factors: denies suspect food, recent foreign travel; denies recent antibiotics.   The illness is also significant for  bloating (Avoids gluten; celiac testing negative) and constipation (Currently having BMs every 1-2 days rated stool 1-2 on [place] scale with episodes of diarrhea once weekly rated 6-7 on [place] scale). The illness does not include chills, dysphagia or odynophagia. Associated symptoms comments: History of vitamin-D and B12 deficiency - currently supplementing with oral vitamins with little improvement in level; she has had intermittent leg and knee aches, and multiple joint pain - concerned about possible connective tissue disease, or mast cell activation syndrome. Being  followed by Rheumatology to investigate possible autoimmune disorder.  Has intermittent episodes of facial swelling and drooping with no specific trigger - during episodes of facial swelling she experiences diarrhea. Significant associated medical issues include GERD (Improved since startin protonix 40 mg once daily; past tx: Pepto-Bismol p.r.n.) and irritable bowel syndrome. Associated medical issues do not include inflammatory bowel disease, gallstones, liver disease, alcohol abuse, PUD, gastric bypass, bowel resection, hemorrhoids or diverticulitis.     Review of Systems   Constitutional:  Positive for appetite change and fatigue (Vitamin-D and vitamin B12 deficiency). Negative for activity change, chills, diaphoresis, fever, unexpected weight change and weight loss.   HENT:  Negative for sore throat and trouble swallowing.    Respiratory:  Negative for cough, choking and shortness of breath.    Cardiovascular:  Negative for chest pain.   Gastrointestinal:  Positive for abdominal pain, bloating (Avoids gluten; celiac testing negative), constipation (Currently having BMs every 1-2 days rated stool 1-2 on [place] scale with episodes of diarrhea once weekly rated 6-7 on [place] scale), diarrhea (Occurs once weekly with no specific triggers; takes Bentyl 10 mg p.r.n. with improvement; also reports her whole family had a GI bug about 3 weeks ago  that caused nausea, diarrhea, bloating, and abdominal pain after eating ground meat) and nausea (improved since starting Protonix 40 mg once daily; eating certain foods such as gluten causes nausea; feels full and nauseous - forgot to get GES). Negative for abdominal distention, anal bleeding, blood in stool, dysphagia, hematemesis, hematochezia, jaundice, melena, rectal pain and vomiting.   Genitourinary:  Negative for dysuria.       No LMP recorded.  Past Medical History:   Diagnosis Date    ADHD     GERD (gastroesophageal reflux disease)     H/O cold sores     Irritable bowel syndrome     Vitamin D insufficiency      Past Surgical History:   Procedure Laterality Date    COLONOSCOPY N/A 03/25/2025    Procedure: COLONOSCOPY;  Surgeon: Cristian Youngblood DO;  Location: [facility] ENDO;  Service: Gastroenterology;  Laterality: N/A;    ESOPHAGOGASTRODUODENOSCOPY N/A 03/25/2025    Procedure: EGD (ESOPHAGOGASTRODUODENOSCOPY);  Surgeon: Cristian Youngblood DO;  Location: [facility] ENDO;  Service: Gastroenterology;  Laterality: N/A;    shoulder surgery Right     UPPER GASTROINTESTINAL ENDOSCOPY       Family History   Problem Relation Name Age of Onset    Other (connective tissue disorder) Mother      Lupus Father      Other (pulmonary htn) Maternal Grandmother          passed at age 27    Ulcerative colitis Paternal Grandmother      Breast cancer Neg Hx      Ovarian cancer Neg Hx      Colon cancer Neg Hx      Esophageal cancer Neg Hx      Stomach cancer Neg Hx      Crohn's disease Neg Hx      Colon polyps Neg Hx       Social History     Tobacco Use    Smoking status: Never     Passive exposure: Never    Smokeless tobacco: Never   Substance Use Topics    Alcohol use: Never    Drug use: Never     Wt Readings from Last 10 Encounters:   07/23/25 58.2 kg (128 lb 5.3 oz)   05/19/25 56.7 kg (125 lb)   04/28/25 56.7 kg (125 lb)   04/17/25 56.1 kg (123 lb 10.9 oz)   03/25/25 57 kg (125 lb 10.6 oz)   02/19/25 56.7 kg (125 lb)   01/28/25  55.3 kg (121 lb 14.6 oz)   12/10/24 55.8 kg (123 lb)   11/06/24 56.2 kg (123 lb 14.4 oz)   10/24/24 56.7 kg (125 lb)     Lab Results   Component Value Date    WBC 9.83 03/20/2024    HGB 14.3 03/20/2024    HCT 40.4 03/20/2024    MCV 86 03/20/2024     03/20/2024     CMP  Sodium   Date Value Ref Range Status   03/20/2024 138 136 - 145 mmol/L Final     Potassium   Date Value Ref Range Status   03/20/2024 3.7 3.5 - 5.1 mmol/L Final     Comment:     Anion Gap reference range revised on 4/28/2023     Chloride   Date Value Ref Range Status   03/20/2024 105 95 - 110 mmol/L Final     CO2   Date Value Ref Range Status   03/20/2024 25 22 - 31 mmol/L Final     Glucose   Date Value Ref Range Status   03/20/2024 115 (H) 70 - 110 mg/dL Final     Comment:     The ADA recommends the following guidelines for fasting glucose:    Normal:       less than 100 mg/dL    Prediabetes:  100 mg/dL to 125 mg/dL    Diabetes:     126 mg/dL or higher       BUN   Date Value Ref Range Status   03/20/2024 10 7 - 18 mg/dL Final     Creatinine   Date Value Ref Range Status   03/20/2024 0.65 0.50 - 1.40 mg/dL Final     Calcium   Date Value Ref Range Status   04/28/2025 9.5 8.7 - 10.5 mg/dL Final   03/20/2024 9.7 8.4 - 10.2 mg/dL Final     Total Protein   Date Value Ref Range Status   03/20/2024 7.9 6.0 - 8.4 g/dL Final     Albumin   Date Value Ref Range Status   03/20/2024 4.8 3.5 - 5.2 g/dL Final     Total Bilirubin   Date Value Ref Range Status   03/20/2024 1.0 0.2 - 1.3 mg/dL Final     Alkaline Phosphatase   Date Value Ref Range Status   03/20/2024 63 38 - 145 U/L Final     AST   Date Value Ref Range Status   03/20/2024 31 14 - 36 U/L Final     ALT   Date Value Ref Range Status   03/20/2024 18 0 - 35 U/L Final     Anion Gap   Date Value Ref Range Status   03/20/2024 8 5 - 12 mmol/L Final     Comment:     Anion Gap reference range revised on 4/28/2023     Reviewed prior medical records including radiology report of KUB 04/17/2025, pelvis  ultrasound 03/24/2025, CT abdomen and pelvis 02/12/2025, chest x-ray 06/14/2023 & endoscopy history (see surgical history).    Objective:      Physical Exam  Vitals and nursing note reviewed.   Constitutional:       General: She is not in acute distress.     Appearance: Normal appearance. She is normal weight. She is not ill-appearing.   HENT:      Mouth/Throat:      Lips: Pink. No lesions.   Cardiovascular:      Pulses: Normal pulses.      Heart sounds: Normal heart sounds.   Pulmonary:      Effort: Pulmonary effort is normal. No respiratory distress.      Breath sounds: Normal breath sounds.   Abdominal:      General: Abdomen is flat. Bowel sounds are normal. There is no distension or abdominal bruit. There are no signs of injury.      Palpations: Abdomen is soft. There is no shifting dullness, fluid wave, hepatomegaly, splenomegaly or mass.      Tenderness: There is generalized abdominal tenderness. There is no guarding or rebound. Negative signs include Kaye's sign, Rovsing's sign and McBurney's sign.   Skin:     General: Skin is warm and dry.      Coloration: Skin is not jaundiced or pale.   Neurological:      Mental Status: She is alert and oriented to person, place, and time.   Psychiatric:         Attention and Perception: Attention normal.         Mood and Affect: Mood normal.         Speech: Speech normal.         Behavior: Behavior normal.         Assessment:       1. Irritable bowel syndrome with both constipation and diarrhea    2. Abdominal bloating    3. Abdominal cramping    4. Chronic nausea    5. Early satiety    6. Gastroesophageal reflux disease without esophagitis    7. Facial swelling    8. Vitamin D deficiency    9. Vitamin B12 deficiency          Plan:       Irritable bowel syndrome with both constipation and diarrhea,  Abdominal bloating, & Abdominal cramping  - discussed with patient that any medication we use to treat constipation can contribute to diarrhea and vice verse, discussed that  it may take some time trying to figure out a good regimen to keep her bowel movements regulated to the patient's satisfaction/desire  -Recommend taking an OTC stool softener such as Colace as directed to avoid hard stools and straining with bowel movements PRN  -Recommend trying OTC MiraLax once daily (17g PO) if above does not control constipation  - SIBO testing ordered  - avoid lactose, alcohol, & caffeine  - avoid known triggers including gluten  - continue low FODMAP diet  - continue Bentyl 10 mg p.r.n. as prescribed for intermittent diarrhea/abdominal cramping    Chronic nausea and Early satiety  Recommend small frequent meals instead of 3 big meals a day, low fat meals & low residue diet.  Avoid: high fiber (insoluble fiber), red meats, dairy, and non-digestible solids (peels, fruit pulp, etc). Avoid NSAIDs and narcotics.  - complete gastric emptying study previously ordered    Gastroesophageal reflux disease without esophagitis   -Avoid large meals, avoid eating within 2-3 hours of bedtime (avoid late night eating & lying down soon after eating), elevate head of bed if nocturnal symptoms are present, smoking cessation (if current smoker), & weight loss (if overweight).   -Avoid known foods which trigger reflux symptoms & to minimize/avoid high-fat foods, chocolate, caffeine, citrus, alcohol, & tomato products.  -Avoid/limit use of NSAID's, since they can cause GI upset, bleeding, and/or ulcers. If needed, take with food.  - continue pantoprazole 40 mg once daily 30 minutes to an hour before breakfast    Facial swelling  -     Ambulatory referral/consult to Allergy; Future; Expected date: 07/30/2025    Vitamin D deficiency & Vitamin B12 deficiency  - follow-up with PCP for continued evaluation and management    Follow up in about 3 months (around 10/23/2025), or if symptoms worsen or fail to improve.      If no improvement in symptoms or symptoms worsen, call/follow-up at clinic or go to ER.        Total time  spent on the encounter includes face to face time and non-face to face time preparing to see the patient (eg, review of tests), Obtaining and/or reviewing separately obtained history, Documenting clinical information in the electronic or other health record, Independently interpreting results (not separately reported) and communicating results to the patient/family/caregiver, or Care coordination (not separately reported).     A dictation software program was used for this note. Please expect some simple typographical  errors in this note.

## 2025-08-01 ENCOUNTER — OFFICE VISIT (OUTPATIENT)
Dept: ALLERGY | Facility: CLINIC | Age: 25
End: 2025-08-01
Payer: COMMERCIAL

## 2025-08-01 ENCOUNTER — LAB VISIT (OUTPATIENT)
Dept: LAB | Facility: HOSPITAL | Age: 25
End: 2025-08-01
Payer: COMMERCIAL

## 2025-08-01 VITALS — WEIGHT: 131.81 LBS | BODY MASS INDEX: 24.26 KG/M2 | HEIGHT: 62 IN

## 2025-08-01 DIAGNOSIS — J31.0 CHRONIC RHINITIS: ICD-10-CM

## 2025-08-01 DIAGNOSIS — B99.9 RECURRENT INFECTIONS: ICD-10-CM

## 2025-08-01 DIAGNOSIS — R22.0 FACIAL SWELLING: ICD-10-CM

## 2025-08-01 DIAGNOSIS — L50.8 RECURRENT URTICARIA: ICD-10-CM

## 2025-08-01 DIAGNOSIS — L50.8 RECURRENT URTICARIA: Primary | ICD-10-CM

## 2025-08-01 LAB
ABSOLUTE EOSINOPHIL (OHS): 0.09 K/UL
ABSOLUTE MONOCYTE (OHS): 0.54 K/UL (ref 0.3–1)
ABSOLUTE NEUTROPHIL COUNT (OHS): 4.31 K/UL (ref 1.8–7.7)
BASOPHILS # BLD AUTO: 0.04 K/UL
BASOPHILS NFR BLD AUTO: 0.6 %
ERYTHROCYTE [DISTWIDTH] IN BLOOD BY AUTOMATED COUNT: 12.2 % (ref 11.5–14.5)
HCT VFR BLD AUTO: 42.4 % (ref 37–48.5)
HGB BLD-MCNC: 14.3 GM/DL (ref 12–16)
IGA SERPL-MCNC: 86 MG/DL (ref 40–350)
IGG SERPL-MCNC: 926 MG/DL (ref 650–1600)
IGM SERPL-MCNC: 175 MG/DL (ref 50–300)
IMM GRANULOCYTES # BLD AUTO: 0.02 K/UL (ref 0–0.04)
IMM GRANULOCYTES NFR BLD AUTO: 0.3 % (ref 0–0.5)
LYMPHOCYTES # BLD AUTO: 1.51 K/UL (ref 1–4.8)
MCH RBC QN AUTO: 30.8 PG (ref 27–31)
MCHC RBC AUTO-ENTMCNC: 33.7 G/DL (ref 32–36)
MCV RBC AUTO: 91 FL (ref 82–98)
NUCLEATED RBC (/100WBC) (OHS): 0 /100 WBC
PLATELET # BLD AUTO: 245 K/UL (ref 150–450)
PMV BLD AUTO: 11.2 FL (ref 9.2–12.9)
RBC # BLD AUTO: 4.64 M/UL (ref 4–5.4)
RELATIVE EOSINOPHIL (OHS): 1.4 %
RELATIVE LYMPHOCYTE (OHS): 23.2 % (ref 18–48)
RELATIVE MONOCYTE (OHS): 8.3 % (ref 4–15)
RELATIVE NEUTROPHIL (OHS): 66.2 % (ref 38–73)
WBC # BLD AUTO: 6.51 K/UL (ref 3.9–12.7)

## 2025-08-01 PROCEDURE — 86003 ALLG SPEC IGE CRUDE XTRC EA: CPT | Mod: 59

## 2025-08-01 PROCEDURE — 82784 ASSAY IGA/IGD/IGG/IGM EACH: CPT | Mod: 59

## 2025-08-01 PROCEDURE — 86360 T CELL ABSOLUTE COUNT/RATIO: CPT

## 2025-08-01 PROCEDURE — 83520 IMMUNOASSAY QUANT NOS NONAB: CPT

## 2025-08-01 PROCEDURE — 88185 FLOWCYTOMETRY/TC ADD-ON: CPT

## 2025-08-01 PROCEDURE — 82787 IGG 1 2 3 OR 4 EACH: CPT

## 2025-08-01 PROCEDURE — 99999 PR PBB SHADOW E&M-EST. PATIENT-LVL IV: CPT | Mod: PBBFAC,,, | Performed by: ALLERGY & IMMUNOLOGY

## 2025-08-01 PROCEDURE — 86160 COMPLEMENT ANTIGEN: CPT

## 2025-08-01 PROCEDURE — 86317 IMMUNOASSAY INFECTIOUS AGENT: CPT

## 2025-08-01 PROCEDURE — 83520 IMMUNOASSAY QUANT NOS NONAB: CPT | Mod: 59

## 2025-08-01 PROCEDURE — 85025 COMPLETE CBC W/AUTO DIFF WBC: CPT

## 2025-08-01 PROCEDURE — 36415 COLL VENOUS BLD VENIPUNCTURE: CPT | Mod: PO

## 2025-08-01 PROCEDURE — 82785 ASSAY OF IGE: CPT

## 2025-08-01 PROCEDURE — 86003 ALLG SPEC IGE CRUDE XTRC EA: CPT

## 2025-08-01 NOTE — PROGRESS NOTES
Subjective:       Patient ID: Sherry Green is a 25 y.o. female.    Chief Complaint:  Angioedema      24 yo woman presents fro consult from NP Ally Lozano fro possible allergie. She states for years she gets episodes of random face swelling- eyes, cheeks, lips. Will come on and last days. She also has random redness/hives, itchy skin. Skin will feel swollen. No triggers can tell. She has developed abdominal pain, GI issues, had scope and only saw inflammation, she identified tomato and garlic as possible triggers but had immunocaps for those which were negative. She had severe eczema as child to point skin peeled but not much now. She does get random hives. She has rhinitis with congestion, runny nose, chronic PND. Is on H 1 blocker daily and still has flares as above. She get sick easily, in fall to winter gets URI and turns to pneumonia. Last year had 3 in less then 6 months. No asthma. Has Flonase and Astelin she uses prn, is on zyrtec daily. No known food, insect or latex allergy. Has been diagnosed with IBS. Has vit D def. Has been to ER and one time had high d dimer but imaging negative, has been told likely has autoimmune condition but not diagnosed. No other medical issues.         Environmental History: see history section for home environment  Review of Systems   HENT:  Positive for congestion, ear pain, facial swelling, postnasal drip, rhinorrhea, sinus pressure and sneezing. Negative for nosebleeds.    Eyes:  Negative for discharge, redness and itching.   Respiratory:  Positive for cough and wheezing. Negative for chest tightness and shortness of breath.    Gastrointestinal:  Positive for abdominal pain.   Skin:  Positive for color change and rash.        Objective:      Physical Exam  Vitals and nursing note reviewed.   Constitutional:       General: She is not in acute distress.     Appearance: Normal appearance. She is not ill-appearing.   HENT:      Nose: No rhinorrhea.   Eyes:      General:          Right eye: No discharge.         Left eye: No discharge.      Conjunctiva/sclera: Conjunctivae normal.   Pulmonary:      Effort: Pulmonary effort is normal. No respiratory distress.   Abdominal:      General: There is no distension.   Skin:     General: Skin is warm and dry.      Findings: No erythema or rash.   Neurological:      Mental Status: She is alert and oriented to person, place, and time.   Psychiatric:         Mood and Affect: Mood normal.         Behavior: Behavior normal.         Laboratory:   none performed   Assessment:       1. Recurrent urticaria    2. Facial swelling    3. Recurrent infections    4. Chronic rhinitis         Plan:       Angioedema, urticaria - advised can be allergic vs immune mediated, explained in detail, will send labs to assess possible causes  Rhinitis and recurrent infections- advised can be allergic vs chronic non allergic vs infections from immune def, collazo end labs to assess  Increase zyrtec to 10 mg BID and up to 20 mg BID if needed, continue Flonase 2 SEN daily and Astelin 1 SEN BID as needed  Phone review  NP Marta notified of completed consult via Epic    I spent a total of 60 minutes on the day of the visit.  This includes face to face time and non-face to face time preparing to see the patient (eg, review of tests), obtaining and/or reviewing separately obtained history, documenting clinical information in the electronic or other health record, independently interpreting results and communicating results to the patient/family/caregiver, or care coordinator.

## 2025-08-02 LAB — IGE SERPL-ACNC: <21 IU/ML

## 2025-08-04 LAB
CD16/56 ABSOLUTE (OHS): 181 CELLS/UL (ref 90–600)
CD16/56% NK CELLS (OHS): 10.57 % (ref 7–31)
CD19 ABSOLUTE (OHS): 238 CELLS/UL (ref 100–500)
CD19% B CELLS (OHS): 13.97 % (ref 6–19)
CD3 ABSOLUTE FLOW (OHS): 1254 CELLS/UL (ref 700–2100)
CD3 PERCENT (OHS): 74.58 % (ref 55–83)
CD3+CD4+ CELLS # SPEC: 700 CELLS/UL (ref 300–1400)
CD3+CD4+ CELLS NFR BLD: 42.31 % (ref 28–57)
CD3+CD8+ CELLS NFR SPEC: 28.98 % (ref 10–39)
CD4/CD8 RATIO FLOW (OHS): 1.46 (ref 0.9–3.6)
CD8 ABSOLUTE FLOW (OHS): 479 CELLS/UL (ref 200–900)
LABORATORY COMMENT REPORT: NORMAL
W IMMUNOGLOBULIN G SUBCLASS 1: 479 MG/DL
W IMMUNOGLOBULIN G SUBCLASS 2: 309 MG/DL
W IMMUNOGLOBULIN G SUBCLASS 3: 78 MG/DL
W IMMUNOGLOBULIN G SUBCLASS 4: 29 MG/DL

## 2025-08-05 LAB
Lab: <0.1 KU/L
Lab: NORMAL
W ALMOND, CLASS: NORMAL
W ALMOND, IGE: <0.1 KU/L
W ALTERNARIA ALTERNATA, CLASS: NORMAL
W ALTERNARIA ALTERNATA, IGE: <0.1 KU/L
W ASPERGILLUS FUMIGATUS, CLASS: NORMAL
W ASPERGILLUS FUMIGATUS, IGE: <0.1 KU/L
W BAHIA GRASS, CLASS: NORMAL
W BAHIA GRASS, IGE: <0.1 KU/L
W BALD CYPRESS, CLASS: NORMAL
W BALD CYPRESS, IGE: <0.1 KU/L
W BERMUDA GRASS, CLASS: NORMAL
W BERMUDA GRASS, IGE: <0.1 KU/L
W CAT DANDER, CLASS: NORMAL
W CAT DANDER, IGE: <0.1 KU/L
W CHAETOMIUM GLOBOSUM, CLASS: NORMAL
W CHAETOMIUM GLOBOSUM, IGE: <0.1 KU/L
W CLADOSPORIUM HERBARUM, CLASS: NORMAL
W CLADOSPORIUM HERBARUM, IGE: <0.1 KU/L
W COCKROACH, GERMAN, CLASS: NORMAL
W COCKROACH, GERMAN, IGE: <0.1 KU/L
W COMMON RAGWEED (SHORT), CLASS: NORMAL
W COMMON RAGWEED (SHORT), IGE: <0.1 KU/L
W COMMON SILVER BIRCH, CLASS: NORMAL
W COMMON SILVER BIRCH, IGE: <0.1 KU/L
W COTTONWOOD, CLASS: NORMAL
W COTTONWOOD, IGE: <0.1 KU/L
W COW'S MILK, CLASS: NORMAL
W COW'S MILK, IGE: <0.1 KU/L
W CRAB, CLASS: NORMAL
W CRAB, IGE: <0.1 KU/L
W CRAYFISH  , CLASS: NORMAL
W CRAYFISH  , IGE: <0.1 KU/L
W CURVULARIA LUNATA, CLASS: NORMAL
W CURVULARIA LUNATA, IGE: <0.1 KU/L
W DERMATOPHAGOIDES FARINAE CLASS: NORMAL
W DERMATOPHAGOIDES FARINAE, IGE: <0.1 KU/L
W DERMATOPHAGOIDES PTERONYSSINUS CLASS: NORMAL
W DERMATOPHAGOIDES PTERONYSSINUS, IGE: <0.1 KU/L
W DOG DANDER, CLASS: NORMAL
W DOG DANDER, IGE: <0.1 KU/L
W EGG WHITE, CLASS: NORMAL
W EGG WHITE, IGE: <0.1 KU/L
W ENGLISH PLANTAIN, RIBWORT, CLASS: NORMAL
W ENGLISH PLANTAIN, RIBWORT, IGE: <0.1 KU/L
W JOHNSON GRASS, CLASS: NORMAL
W JOHNSON GRASS, IGE: <0.1 KU/L
W LATEX , CLASS: NORMAL
W LATEX , IGE: <0.1 KU/L
W MAPLE LEAF SYC., LONDON PLANE, CLASS: NORMAL
W MAPLE LEAF SYC., LONDON PLANE, IGE: <0.1 KU/L
W MUGWORT (SAGEBRUSH), CLASS: NORMAL
W MUGWORT (SAGEBRUSH), IGE: <0.1 KU/L
W OAK, CLASS: NORMAL
W OAK, IGE: <0.1 KU/L
W OAT , CLASS: NORMAL
W OAT , IGE: <0.1 KU/L
W PEANUT, CLASS: NORMAL
W PEANUT, IGE: <0.1 KU/L
W PECAN NUT, CLASS: NORMAL
W PECAN NUT, IGE: <0.1 KU/L
W PECAN, HICKORY, CLASS: NORMAL
W PECAN, HICKORY, IGE: <0.1 KU/L
W PENICILLIUM CHRYSOGENUM, CLASS: NORMAL
W PENICILLIUM CHRYSOGENUM, IGE: <0.1 KU/L
W ROUGH MARSHELDER, CLASS: NORMAL
W ROUGH MARSHELDER, IGE: <0.1 KU/L
W SALTWORT, RUSSIAN THISTLE, CLASS: NORMAL
W SALTWORT, RUSSIAN THISTLE, IGE: <0.1 KU/L
W SETOMELANOMMA ROSTRATA, CLASS: NORMAL
W SETOMELANOMMA ROSTRATA, IGE: <0.1 KU/L
W SHRIMP, CLASS: NORMAL
W SHRIMP, IGE: <0.1 KU/L
W SOYBEAN, CLASS: NORMAL
W SOYBEAN, IGE: <0.1 KU/L
W SUNFLOWER SEED , CLASS: NORMAL
W SUNFLOWER SEED , IGE: <0.1 KU/L
W TIMOTHY GRASS, CLASS: NORMAL
W TIMOTHY GRASS, IGE: <0.1 KU/L
W TOMATO , CLASS: NORMAL
W TOMATO , IGE: <0.1 KU/L
W WHEAT, CLASS: NORMAL
W WHEAT, IGE: <0.1 KU/L
W WILLOW, CLASS: NORMAL
W WILLOW, IGE: <0.1 KU/L

## 2025-08-06 LAB
C1INH SERPL-MCNC: 33 MG/DL (ref 21–39)
TRYPTASE SERPL-MCNC: 3.6 NG/ML
W SESAME SEED , CLASS: NORMAL
W SESAME SEED , IGE: <0.1 KU/L

## 2025-08-07 LAB
C1INH ACT/NOR SERPL: >90 %
SEROTYPE 1 (1): 1.7
SEROTYPE 12 (12F): <0.3
SEROTYPE 14 (14): <0.3
SEROTYPE 19 (19F): 2.9
SEROTYPE 23 (23F): <0.3
SEROTYPE 26 (6B): <0.3
SEROTYPE 3 (3): 0.7
SEROTYPE 4 (4): <0.3
SEROTYPE 5 (5): 1.5
SEROTYPE 51 (7F): 0.4
SEROTYPE 56 (18C): 2.3
SEROTYPE 68 (9V): <0.3
SEROTYPE 8 (8): <0.3
SEROTYPE 9 (9N): <0.3

## 2025-08-12 ENCOUNTER — PATIENT MESSAGE (OUTPATIENT)
Dept: ALLERGY | Facility: CLINIC | Age: 25
End: 2025-08-12
Payer: COMMERCIAL

## 2025-08-26 ENCOUNTER — PATIENT MESSAGE (OUTPATIENT)
Dept: RADIOLOGY | Facility: HOSPITAL | Age: 25
End: 2025-08-26
Payer: COMMERCIAL

## 2025-08-28 ENCOUNTER — HOSPITAL ENCOUNTER (OUTPATIENT)
Dept: RADIOLOGY | Facility: HOSPITAL | Age: 25
Discharge: HOME | End: 2025-08-28
Payer: COMMERCIAL

## 2025-08-28 DIAGNOSIS — R68.81 EARLY SATIETY: ICD-10-CM

## 2025-08-28 PROCEDURE — 78264 GASTRIC EMPTYING IMG STUDY: CPT | Mod: 26,,, | Performed by: STUDENT IN AN ORGANIZED HEALTH CARE EDUCATION/TRAINING PROGRAM

## 2025-08-28 PROCEDURE — A9541 TC99M SULFUR COLLOID: HCPCS | Mod: PO

## 2025-08-28 PROCEDURE — 78264 GASTRIC EMPTYING IMG STUDY: CPT | Mod: TC,PO

## 2025-08-28 RX ORDER — TECHNETIUM TC 99M SULFUR COLLOID 2 MG
1.02 KIT MISCELLANEOUS
Status: COMPLETED | OUTPATIENT
Start: 2025-08-28 | End: 2025-08-28

## 2025-08-28 RX ADMIN — TECHNETIUM TC 99M SULFUR COLLOID KIT 1.02 MILLICURIE: KIT at 11:08
